# Patient Record
Sex: MALE | Race: BLACK OR AFRICAN AMERICAN | Employment: OTHER | URBAN - METROPOLITAN AREA
[De-identification: names, ages, dates, MRNs, and addresses within clinical notes are randomized per-mention and may not be internally consistent; named-entity substitution may affect disease eponyms.]

---

## 2022-03-19 PROBLEM — N18.32 STAGE 3B CHRONIC KIDNEY DISEASE (HCC): Status: ACTIVE | Noted: 2020-12-15

## 2022-09-22 PROBLEM — I25.10 CAD (CORONARY ARTERY DISEASE): Status: ACTIVE | Noted: 2022-09-22

## 2022-11-28 PROBLEM — Z95.5 STATUS POST INSERTION OF DRUG-ELUTING STENT INTO LEFT ANTERIOR DESCENDING (LAD) ARTERY: Status: ACTIVE | Noted: 2022-11-28

## 2023-01-09 ENCOUNTER — TELEPHONE (OUTPATIENT)
Dept: CARDIOLOGY CLINIC | Age: 78
End: 2023-01-09

## 2023-01-09 DIAGNOSIS — R55 SYNCOPE AND COLLAPSE: Primary | ICD-10-CM

## 2023-01-09 NOTE — TELEPHONE ENCOUNTER
Received call from patient stating he had a syncopal episode about a week ago and loss consciousness for a few seconds. States he may have been overheated at the time. No headache, weakness or dizziness noted prior to episode and no symptoms noted since episode. He also noted that later the same evening he had 2 episodes of hematuria which then cleared up. His blood pressure had maintained at an average 136/70. Continue not to have any symptoms. Discussed with Dr. Seamus Pak and record reviewed. Verbal order and read back per Nubia Henderson MD  - order 14 day event monitor  - notify our office of any new symptoms or any syncopal or near syncopal episodes. This has been fully explained to the patient, who indicates understanding.

## 2023-01-31 ENCOUNTER — TELEPHONE (OUTPATIENT)
Dept: CARDIOLOGY CLINIC | Age: 78
End: 2023-01-31

## 2023-01-31 NOTE — TELEPHONE ENCOUNTER
Patient made aware of echo results and Dr. Sharma Resides remarks. No questions or concerns at present.

## 2023-01-31 NOTE — TELEPHONE ENCOUNTER
----- Message from Kristen Leroy MD sent at 1/31/2023  2:06 PM EST -----  Please let the patient know that his event monitor did not show any significant arrhythmias.  ----- Message -----  From: Tawana Cherry LPN  Sent: 6/21/6790  12:57 PM EST  To: Kristen Leroy MD    Received call from patient stating he had a syncopal episode about a week ago and loss consciousness for a few seconds. States he may have been overheated at the time. No headache, weakness or dizziness noted prior to episode and no symptoms noted since episode. He also noted that later the same evening he had 2 episodes of hematuria which then cleared up. His blood pressure had maintained at an average 136/70. Continue not to have any symptoms. Discussed with Dr. Alonzo Morris and record reviewed. Verbal order and read back per Kristen Leroy MD  - order 14 day event monitor  - notify our office of any new symptoms or any syncopal or near syncopal episodes.   This has been fully explained to the patient, who indicates understanding.

## 2023-02-06 RX ORDER — ROSUVASTATIN CALCIUM 20 MG/1
20 TABLET, COATED ORAL
Qty: 90 TABLET | Refills: 2 | Status: SHIPPED | OUTPATIENT
Start: 2023-02-06

## 2023-02-17 ENCOUNTER — CLINICAL DOCUMENTATION (OUTPATIENT)
Age: 78
End: 2023-02-17

## 2023-02-17 NOTE — PROGRESS NOTES
Received cardiac clearance form from [facility] for [facility] and holding Brilinta. Form filled out per Dr. Dennis Person and faxed to their office. Patient unable to stop Brilinta until the end of April 2023. Confirmation fax received.

## 2023-03-22 ENCOUNTER — HOSPITAL ENCOUNTER (OUTPATIENT)
Facility: HOSPITAL | Age: 78
Discharge: HOME | End: 2023-03-25
Payer: COMMERCIAL

## 2023-03-22 LAB
ALBUMIN SERPL-MCNC: 2.9 G/DL (ref 3.4–5)
ALBUMIN/GLOB SERPL: 0.8 (ref 0.8–1.7)
ALP SERPL-CCNC: 50 U/L (ref 45–117)
ALT SERPL-CCNC: 32 U/L (ref 16–61)
ANION GAP SERPL CALC-SCNC: 5 MMOL/L (ref 3–18)
AST SERPL-CCNC: 20 U/L (ref 10–38)
BASOPHILS # BLD: 0.1 K/UL (ref 0–0.1)
BASOPHILS NFR BLD: 1 % (ref 0–2)
BILIRUB SERPL-MCNC: 0.3 MG/DL (ref 0.2–1)
BUN SERPL-MCNC: 30 MG/DL (ref 7–18)
BUN/CREAT SERPL: 13 (ref 12–20)
CALCIUM SERPL-MCNC: 9.2 MG/DL (ref 8.5–10.1)
CHLORIDE SERPL-SCNC: 114 MMOL/L (ref 100–111)
CO2 SERPL-SCNC: 23 MMOL/L (ref 21–32)
CREAT SERPL-MCNC: 2.24 MG/DL (ref 0.6–1.3)
DIFFERENTIAL METHOD BLD: ABNORMAL
EOSINOPHIL # BLD: 0.6 K/UL (ref 0–0.4)
EOSINOPHIL NFR BLD: 7 % (ref 0–5)
ERYTHROCYTE [DISTWIDTH] IN BLOOD BY AUTOMATED COUNT: 13.4 % (ref 11.6–14.5)
GLOBULIN SER CALC-MCNC: 3.7 G/DL (ref 2–4)
GLUCOSE SERPL-MCNC: 189 MG/DL (ref 74–99)
HCT VFR BLD AUTO: 33.8 % (ref 36–48)
HGB BLD-MCNC: 11 G/DL (ref 13–16)
IMM GRANULOCYTES # BLD AUTO: 0 K/UL (ref 0–0.04)
IMM GRANULOCYTES NFR BLD AUTO: 0 % (ref 0–0.5)
INR PPP: 1 (ref 0.8–1.2)
LYMPHOCYTES # BLD: 1.9 K/UL (ref 0.9–3.6)
LYMPHOCYTES NFR BLD: 22 % (ref 21–52)
MCH RBC QN AUTO: 29.6 PG (ref 24–34)
MCHC RBC AUTO-ENTMCNC: 32.5 G/DL (ref 31–37)
MCV RBC AUTO: 91.1 FL (ref 78–100)
MONOCYTES # BLD: 1 K/UL (ref 0.05–1.2)
MONOCYTES NFR BLD: 11 % (ref 3–10)
NEUTS SEG # BLD: 5.1 K/UL (ref 1.8–8)
NEUTS SEG NFR BLD: 58 % (ref 40–73)
NRBC # BLD: 0 K/UL (ref 0–0.01)
NRBC BLD-RTO: 0 PER 100 WBC
PLATELET # BLD AUTO: 232 K/UL (ref 135–420)
PMV BLD AUTO: 9.7 FL (ref 9.2–11.8)
POTASSIUM SERPL-SCNC: 4.1 MMOL/L (ref 3.5–5.5)
PROT SERPL-MCNC: 6.6 G/DL (ref 6.4–8.2)
PROTHROMBIN TIME: 13.7 SEC (ref 11.5–15.2)
RBC # BLD AUTO: 3.71 M/UL (ref 4.35–5.65)
SODIUM SERPL-SCNC: 142 MMOL/L (ref 136–145)
WBC # BLD AUTO: 8.7 K/UL (ref 4.6–13.2)

## 2023-03-22 PROCEDURE — 85610 PROTHROMBIN TIME: CPT

## 2023-03-22 PROCEDURE — 80053 COMPREHEN METABOLIC PANEL: CPT

## 2023-03-22 PROCEDURE — 85025 COMPLETE CBC W/AUTO DIFF WBC: CPT

## 2023-03-22 PROCEDURE — 36415 COLL VENOUS BLD VENIPUNCTURE: CPT

## 2023-04-03 PROBLEM — E11.9 DIABETES MELLITUS, TYPE 2 (HCC): Status: RESOLVED | Noted: 2021-08-03 | Resolved: 2021-08-03

## 2023-04-27 ENCOUNTER — TELEPHONE (OUTPATIENT)
Age: 78
End: 2023-04-27

## 2023-05-10 ENCOUNTER — ANESTHESIA EVENT (OUTPATIENT)
Facility: HOSPITAL | Age: 78
End: 2023-05-10
Payer: COMMERCIAL

## 2023-05-11 ENCOUNTER — ANESTHESIA (OUTPATIENT)
Facility: HOSPITAL | Age: 78
End: 2023-05-11
Payer: COMMERCIAL

## 2023-05-11 ENCOUNTER — HOSPITAL ENCOUNTER (OUTPATIENT)
Facility: HOSPITAL | Age: 78
Setting detail: OUTPATIENT SURGERY
Discharge: HOME | End: 2023-05-11
Attending: INTERNAL MEDICINE | Admitting: INTERNAL MEDICINE
Payer: COMMERCIAL

## 2023-05-11 VITALS
WEIGHT: 197.1 LBS | SYSTOLIC BLOOD PRESSURE: 134 MMHG | TEMPERATURE: 97.6 F | BODY MASS INDEX: 29.19 KG/M2 | HEIGHT: 69 IN | RESPIRATION RATE: 15 BRPM | OXYGEN SATURATION: 97 % | DIASTOLIC BLOOD PRESSURE: 64 MMHG | HEART RATE: 54 BPM

## 2023-05-11 LAB
BASOPHILS # BLD: 0.1 K/UL (ref 0–0.1)
BASOPHILS NFR BLD: 1 % (ref 0–2)
DIFFERENTIAL METHOD BLD: ABNORMAL
EOSINOPHIL # BLD: 0.5 K/UL (ref 0–0.4)
EOSINOPHIL NFR BLD: 6 % (ref 0–5)
ERYTHROCYTE [DISTWIDTH] IN BLOOD BY AUTOMATED COUNT: 13 % (ref 11.6–14.5)
FERRITIN SERPL-MCNC: 200 NG/ML (ref 8–388)
GLUCOSE BLD STRIP.AUTO-MCNC: 179 MG/DL (ref 70–110)
GLUCOSE BLD STRIP.AUTO-MCNC: 198 MG/DL (ref 70–110)
HCT VFR BLD AUTO: 31.5 % (ref 36–48)
HGB BLD-MCNC: 10.2 G/DL (ref 13–16)
IMM GRANULOCYTES # BLD AUTO: 0 K/UL (ref 0–0.04)
IMM GRANULOCYTES NFR BLD AUTO: 0 % (ref 0–0.5)
IRON SATN MFR SERPL: 24 % (ref 20–50)
IRON SERPL-MCNC: 46 UG/DL (ref 50–175)
LYMPHOCYTES # BLD: 1.9 K/UL (ref 0.9–3.6)
LYMPHOCYTES NFR BLD: 26 % (ref 21–52)
MCH RBC QN AUTO: 29.7 PG (ref 24–34)
MCHC RBC AUTO-ENTMCNC: 32.4 G/DL (ref 31–37)
MCV RBC AUTO: 91.8 FL (ref 78–100)
MONOCYTES # BLD: 0.9 K/UL (ref 0.05–1.2)
MONOCYTES NFR BLD: 12 % (ref 3–10)
NEUTS SEG # BLD: 4 K/UL (ref 1.8–8)
NEUTS SEG NFR BLD: 55 % (ref 40–73)
NRBC # BLD: 0 K/UL (ref 0–0.01)
NRBC BLD-RTO: 0 PER 100 WBC
PLATELET # BLD AUTO: 201 K/UL (ref 135–420)
PMV BLD AUTO: 9.6 FL (ref 9.2–11.8)
RBC # BLD AUTO: 3.43 M/UL (ref 4.35–5.65)
TIBC SERPL-MCNC: 193 UG/DL (ref 250–450)
WBC # BLD AUTO: 7.3 K/UL (ref 4.6–13.2)

## 2023-05-11 PROCEDURE — 3600007512: Performed by: INTERNAL MEDICINE

## 2023-05-11 PROCEDURE — 6360000002 HC RX W HCPCS: Performed by: NURSE ANESTHETIST, CERTIFIED REGISTERED

## 2023-05-11 PROCEDURE — 83550 IRON BINDING TEST: CPT

## 2023-05-11 PROCEDURE — 83540 ASSAY OF IRON: CPT

## 2023-05-11 PROCEDURE — 2580000003 HC RX 258: Performed by: INTERNAL MEDICINE

## 2023-05-11 PROCEDURE — 7100000000 HC PACU RECOVERY - FIRST 15 MIN: Performed by: INTERNAL MEDICINE

## 2023-05-11 PROCEDURE — 36415 COLL VENOUS BLD VENIPUNCTURE: CPT

## 2023-05-11 PROCEDURE — 7100000011 HC PHASE II RECOVERY - ADDTL 15 MIN: Performed by: INTERNAL MEDICINE

## 2023-05-11 PROCEDURE — 3600007502: Performed by: INTERNAL MEDICINE

## 2023-05-11 PROCEDURE — 3700000001 HC ADD 15 MINUTES (ANESTHESIA): Performed by: INTERNAL MEDICINE

## 2023-05-11 PROCEDURE — 2500000003 HC RX 250 WO HCPCS: Performed by: NURSE ANESTHETIST, CERTIFIED REGISTERED

## 2023-05-11 PROCEDURE — 82962 GLUCOSE BLOOD TEST: CPT

## 2023-05-11 PROCEDURE — 7100000010 HC PHASE II RECOVERY - FIRST 15 MIN: Performed by: INTERNAL MEDICINE

## 2023-05-11 PROCEDURE — 2709999900 HC NON-CHARGEABLE SUPPLY: Performed by: INTERNAL MEDICINE

## 2023-05-11 PROCEDURE — 88342 IMHCHEM/IMCYTCHM 1ST ANTB: CPT

## 2023-05-11 PROCEDURE — 88305 TISSUE EXAM BY PATHOLOGIST: CPT

## 2023-05-11 PROCEDURE — 3700000000 HC ANESTHESIA ATTENDED CARE: Performed by: INTERNAL MEDICINE

## 2023-05-11 PROCEDURE — 85025 COMPLETE CBC W/AUTO DIFF WBC: CPT

## 2023-05-11 PROCEDURE — 82728 ASSAY OF FERRITIN: CPT

## 2023-05-11 RX ORDER — PROPOFOL 10 MG/ML
INJECTION, EMULSION INTRAVENOUS PRN
Status: DISCONTINUED | OUTPATIENT
Start: 2023-05-11 | End: 2023-05-11 | Stop reason: SDUPTHER

## 2023-05-11 RX ORDER — LIDOCAINE HYDROCHLORIDE 20 MG/ML
INJECTION, SOLUTION EPIDURAL; INFILTRATION; INTRACAUDAL; PERINEURAL PRN
Status: DISCONTINUED | OUTPATIENT
Start: 2023-05-11 | End: 2023-05-11 | Stop reason: SDUPTHER

## 2023-05-11 RX ORDER — SODIUM CHLORIDE, SODIUM LACTATE, POTASSIUM CHLORIDE, CALCIUM CHLORIDE 600; 310; 30; 20 MG/100ML; MG/100ML; MG/100ML; MG/100ML
INJECTION, SOLUTION INTRAVENOUS CONTINUOUS
Status: DISCONTINUED | OUTPATIENT
Start: 2023-05-11 | End: 2023-05-11 | Stop reason: HOSPADM

## 2023-05-11 RX ADMIN — PROPOFOL 50 MG: 10 INJECTION, EMULSION INTRAVENOUS at 07:40

## 2023-05-11 RX ADMIN — PROPOFOL 50 MG: 10 INJECTION, EMULSION INTRAVENOUS at 07:48

## 2023-05-11 RX ADMIN — LIDOCAINE HYDROCHLORIDE 60 MG: 20 INJECTION, SOLUTION EPIDURAL; INFILTRATION; INTRACAUDAL; PERINEURAL at 07:34

## 2023-05-11 RX ADMIN — SODIUM CHLORIDE, SODIUM LACTATE, POTASSIUM CHLORIDE, AND CALCIUM CHLORIDE: 600; 310; 30; 20 INJECTION, SOLUTION INTRAVENOUS at 07:30

## 2023-05-11 RX ADMIN — PROPOFOL 100 MG: 10 INJECTION, EMULSION INTRAVENOUS at 07:34

## 2023-05-11 RX ADMIN — PROPOFOL 50 MG: 10 INJECTION, EMULSION INTRAVENOUS at 07:39

## 2023-05-11 ASSESSMENT — PAIN SCALES - GENERAL
PAINLEVEL_OUTOF10: 0

## 2023-05-11 ASSESSMENT — PAIN - FUNCTIONAL ASSESSMENT: PAIN_FUNCTIONAL_ASSESSMENT: 0-10

## 2023-05-11 NOTE — DISCHARGE INSTRUCTIONS
Upper GI Endoscopy: What to Expect at [address] had an upper GI endoscopy. Your doctor used a thin, lighted tube that bends to look at the inside of your esophagus, your stomach, and the first part of the small intestine, called the duodenum. After you have an endoscopy, you will stay at the hospital or clinic for 1 to 2 hours. This will allow the medicine to wear off. You will be able to go home after your doctor or nurse checks to make sure that you're not having any problems. You may have to stay overnight if you had treatment during the test. You may have a sore throat for a day or two after the test.  This care sheet gives you a general idea about what to expect after the test.  How can you care for yourself at home? Activity   Rest as much as you need to after you go home. You should be able to go back to your usual activities the day after the test.  Diet   Follow your doctor's directions for eating after the test.  Drink plenty of fluids (unless your doctor has told you not to). Medications   If you have a sore throat the day after the test, use an over-the-counter spray to numb your throat. Follow-up care is a key part of your treatment and safety. Be sure to make and go to all appointments, and call your doctor if you are having problems. It's also a good idea to know your test results and keep a list of the medicines you take. When should you call for help? Call 911 anytime you think you may need emergency care. For example, call if:    You passed out (lost consciousness). You have trouble breathing. You pass maroon or bloody stools. Call your doctor now or seek immediate medical care if:    You have pain that does not get better after your take pain medicine. You have new or worse belly pain. You have blood in your stools. You are sick to your stomach and cannot keep fluids down. You have a fever. You cannot pass stools or gas.    Watch closely

## 2023-05-11 NOTE — H&P
[facility]  [phone]      History and Physical    Patient: Star Rivera MRN: [id number]  SSN: [id number]    YOB: 1945  Age: 69 y.o. Sex: male      Subjective:      Star Rivera is a 69 y.o. male who is seen today for a follow up visit. He was referred by PCP for evaluation of STANLEY. 1. Anemia - He has a history of MGUS, followed by Dr. Tapan German, hematologist. He has previously received iron infusions in 2021 when he was noted to have absent iron stores on bone marrow biopsy. More recently, he had an episode of syncope while visiting sister which prompted labs, noted to have drop in Hgb since comparison labs. He denies rectal bleeding or melena. He was recently started on oral iron by PCP. He also has CKD3, followed by nephrology, has not required epo. He had episode of deep hematuria last month, followed by urology for BPH, has follow up planned. 1/4/2023 - [facility] labs - Hgb 10.8, Hct 34.1, MCV 92, Iron sat 17%, Ferritin 250, Cr 2.3, eGFR 28.6, 10/2022 Hgb 11.7, Hct 35.5    2. Weight loss - He reports purposeful weight loss which has been quicker than expected. Per PCP documentation, 17lbs in 3 week span. Since then, he reports weight loss has stabilized. He has been less restrictive w/ diet. He denies dysphagia, early satiety, change in appetite, nausea, vomiting. 3. CRCS - Colonoscopy up to date, last done 9/2022 as noted below, 3 year repeat needed for polyps. History of villous adenoma in 2017. Americo Vishnu Past Medical History:   Diagnosis Date    Bladder outlet obstruction     BPH (benign prostatic hyperplasia)     Carotid bruit 06/12/2014    No significant occlusive disease bilaterally. Nodules in right thyroid. Chest pain     sounds musculoskeletal in etiology    CVA (cerebral infarction)     LACUNAR 6/2014    DVT (deep venous thrombosis) ([facility] [address] 03/04/2017    Right leg:  No DVT.     Elevated prostate specific antigen (PSA)     Elevated PSA     Enlarged

## 2023-05-11 NOTE — ANESTHESIA POSTPROCEDURE EVALUATION
Department of Anesthesiology  Postprocedure Note    Patient: Isacc Núñez  MRN: [id number]  YOB: 1945  Date of evaluation: 5/11/2023      Procedure Summary     Date: 05/11/23 Room / Location: [facility] ENDO 03 / [facility] ENDOSCOPY    Anesthesia Start: 0730 Anesthesia Stop: 2001    Procedure: EGD ESOPHAGOGASTRODUODENOSCOPY with bxs and polypectomies and clip (Upper GI Region) Diagnosis:       Personal history of colonic polyps      Iron deficiency anemia, unspecified iron deficiency anemia type      Overweight      Weight loss      (Personal history of colonic polyps [Z86.010])      (Iron deficiency anemia, unspecified iron deficiency anemia type [D50.9])      (Overweight [E66.3])      (Weight loss [R63.4])    Surgeons: Judy Montiel MD Responsible Provider: Matilda Jimenez MD    Anesthesia Type: MAC ASA Status: 3          Anesthesia Type: MAC    Abhi Phase I: Abhi Score: 10    Abhi Phase II:        Anesthesia Post Evaluation    Patient location during evaluation: PACU  Patient participation: complete - patient participated  Level of consciousness: awake  Airway patency: patent  Nausea & Vomiting: no nausea  Complications: no  Cardiovascular status: blood pressure returned to baseline  Respiratory status: acceptable  Hydration status: euvolemic

## 2023-05-11 NOTE — ANESTHESIA PRE PROCEDURE
lb (90.3 kg)   11/28/22 202 lb (91.6 kg)     Body mass index is 27.76 kg/m². CBC:   Lab Results   Component Value Date/Time    WBC 8.7 03/22/2023 10:24 AM    RBC 3.71 03/22/2023 10:24 AM    HGB 11.0 03/22/2023 10:24 AM    HCT 33.8 03/22/2023 10:24 AM    MCV 91.1 03/22/2023 10:24 AM    RDW 13.4 03/22/2023 10:24 AM     03/22/2023 10:24 AM       CMP:   Lab Results   Component Value Date/Time     03/22/2023 10:24 AM    K 4.1 03/22/2023 10:24 AM     03/22/2023 10:24 AM    CO2 23 03/22/2023 10:24 AM    BUN 30 03/22/2023 10:24 AM    CREATININE 2.24 03/22/2023 10:24 AM    GFRAA 45 09/23/2022 05:10 AM    AGRATIO 1.1 10/19/2022 10:52 AM    LABGLOM 30 03/22/2023 10:24 AM    GLUCOSE 189 03/22/2023 10:24 AM    PROT 6.6 03/22/2023 10:24 AM    CALCIUM 9.2 03/22/2023 10:24 AM    BILITOT 0.3 03/22/2023 10:24 AM    ALKPHOS 50 03/22/2023 10:24 AM    ALKPHOS 65 10/19/2022 10:52 AM    AST 20 03/22/2023 10:24 AM    ALT 32 03/22/2023 10:24 AM       POC Tests: No results for input(s): POCGLU, POCNA, POCK, POCCL, POCBUN, POCHEMO, POCHCT in the last 72 hours.     Coags:   Lab Results   Component Value Date/Time    PROTIME 13.7 03/22/2023 10:24 AM    INR 1.0 03/22/2023 10:24 AM    APTT 29.8 05/19/2021 09:52 AM       HCG (If Applicable): No results found for: PREGTESTUR, PREGSERUM, HCG, HCGQUANT     ABGs: No results found for: PHART, PO2ART, NPV7NZN, JGY9KZJ, BEART, V4XIGAFR     Type & Screen (If Applicable):  No results found for: LABABO, [address]    Drug/Infectious Status (If Applicable):  Lab Results   Component Value Date/Time    HEPCAB 0.1 11/29/2016 12:31 PM    HEPCAB NEGATIVE 11/29/2016 12:31 PM       COVID-19 Screening (If Applicable): No results found for: COVID19        Anesthesia Evaluation  Patient summary reviewed and Nursing notes reviewed  Airway: Mallampati: II  TM distance: >3 FB   Neck ROM: full  Mouth opening: > = 3 FB   Dental: normal exam         Pulmonary:normal exam    (+) sleep apnea:

## 2023-05-11 NOTE — PROGRESS NOTES
Patient may resume Brillinta in 72 hours. Cristina Choudhury MD  [facility] [address]  cell - [phone]  www. [facility]. [facility]

## 2023-05-23 RX ORDER — TICAGRELOR 90 MG/1
TABLET ORAL
Qty: 60 TABLET | Refills: 6 | Status: SHIPPED | OUTPATIENT
Start: 2023-05-23

## 2023-05-25 ENCOUNTER — HOSPITAL ENCOUNTER (OUTPATIENT)
Facility: HOSPITAL | Age: 78
Discharge: HOME | End: 2023-05-25
Payer: COMMERCIAL

## 2023-05-25 LAB
25(OH)D3 SERPL-MCNC: 105.8 NG/ML (ref 30–100)
ALBUMIN SERPL-MCNC: 2.9 G/DL (ref 3.4–5)
ANION GAP SERPL CALC-SCNC: 4 MMOL/L (ref 3–18)
BUN SERPL-MCNC: 40 MG/DL (ref 7–18)
BUN/CREAT SERPL: 16 (ref 12–20)
CALCIUM SERPL-MCNC: 9 MG/DL (ref 8.5–10.1)
CALCIUM SERPL-MCNC: 9 MG/DL (ref 8.5–10.1)
CHLORIDE SERPL-SCNC: 110 MMOL/L (ref 100–111)
CO2 SERPL-SCNC: 25 MMOL/L (ref 21–32)
CREAT SERPL-MCNC: 2.43 MG/DL (ref 0.6–1.3)
CREAT UR-MCNC: 80 MG/DL (ref 30–125)
FERRITIN SERPL-MCNC: 227 NG/ML (ref 8–388)
GLUCOSE SERPL-MCNC: 204 MG/DL (ref 74–99)
HCT VFR BLD AUTO: 33 % (ref 36–48)
HGB BLD-MCNC: 10.7 G/DL (ref 13–16)
IRON SATN MFR SERPL: 23 % (ref 20–50)
IRON SERPL-MCNC: 51 UG/DL (ref 50–175)
MICROALBUMIN UR-MCNC: 130 MG/DL (ref 0–3)
MICROALBUMIN/CREAT UR-RTO: 1625 MG/G (ref 0–30)
PHOSPHATE SERPL-MCNC: 4.1 MG/DL (ref 2.5–4.9)
POTASSIUM SERPL-SCNC: 4.3 MMOL/L (ref 3.5–5.5)
PTH-INTACT SERPL-MCNC: 73.2 PG/ML (ref 18.4–88)
SODIUM SERPL-SCNC: 139 MMOL/L (ref 136–145)
TIBC SERPL-MCNC: 221 UG/DL (ref 250–450)

## 2023-05-25 PROCEDURE — 85018 HEMOGLOBIN: CPT

## 2023-05-25 PROCEDURE — 82043 UR ALBUMIN QUANTITATIVE: CPT

## 2023-05-25 PROCEDURE — 85014 HEMATOCRIT: CPT

## 2023-05-25 PROCEDURE — 83550 IRON BINDING TEST: CPT

## 2023-05-25 PROCEDURE — 83970 ASSAY OF PARATHORMONE: CPT

## 2023-05-25 PROCEDURE — 36415 COLL VENOUS BLD VENIPUNCTURE: CPT

## 2023-05-25 PROCEDURE — 82570 ASSAY OF URINE CREATININE: CPT

## 2023-05-25 PROCEDURE — 82306 VITAMIN D 25 HYDROXY: CPT

## 2023-05-25 PROCEDURE — 82728 ASSAY OF FERRITIN: CPT

## 2023-05-25 PROCEDURE — 80069 RENAL FUNCTION PANEL: CPT

## 2023-05-25 PROCEDURE — 83540 ASSAY OF IRON: CPT

## 2023-06-02 PROBLEM — M54.16 LUMBAR RADICULOPATHY: Status: ACTIVE | Noted: 2023-06-02

## 2023-06-02 PROBLEM — T50.8X5A CONTRAST-INDUCED NEPHROPATHY: Status: ACTIVE | Noted: 2022-11-15

## 2023-06-02 PROBLEM — M47.817 LUMBOSACRAL SPONDYLOSIS WITHOUT MYELOPATHY: Status: ACTIVE | Noted: 2023-06-02

## 2023-06-02 PROBLEM — R19.7 DIARRHEA: Status: ACTIVE | Noted: 2021-12-12

## 2023-06-02 PROBLEM — E66.09 OBESITY DUE TO EXCESS CALORIES: Status: ACTIVE | Noted: 2023-06-02

## 2023-06-02 PROBLEM — E78.5 HYPERLIPIDEMIA: Status: ACTIVE | Noted: 2021-09-13

## 2023-06-02 PROBLEM — H43.813 VITREOUS DEGENERATION OF BOTH EYES: Status: ACTIVE | Noted: 2021-09-13

## 2023-06-02 PROBLEM — R55 SYNCOPE AND COLLAPSE: Status: ACTIVE | Noted: 2021-12-12

## 2023-06-02 PROBLEM — N17.9 AKI (ACUTE KIDNEY INJURY) (HCC): Status: ACTIVE | Noted: 2021-12-12

## 2023-06-02 PROBLEM — I45.10 RBBB: Status: ACTIVE | Noted: 2021-12-12

## 2023-06-02 PROBLEM — M43.10 ACQUIRED SPONDYLOLISTHESIS: Status: ACTIVE | Noted: 2023-06-02

## 2023-06-02 PROBLEM — N42.9 PROSTATE DISORDER: Status: ACTIVE | Noted: 2021-12-12

## 2023-06-02 PROBLEM — E66.3 OVERWEIGHT (BMI 25.0-29.9): Status: ACTIVE | Noted: 2021-12-12

## 2023-06-02 PROBLEM — H25.013 CORTICAL AGE-RELATED CATARACT, BILATERAL: Status: ACTIVE | Noted: 2021-09-13

## 2023-06-02 PROBLEM — D47.2 MONOCLONAL GAMMOPATHY OF UNKNOWN SIGNIFICANCE (MGUS): Status: ACTIVE | Noted: 2022-11-15

## 2023-06-02 PROBLEM — N14.11 CONTRAST-INDUCED NEPHROPATHY: Status: ACTIVE | Noted: 2022-11-15

## 2023-06-02 RX ORDER — ACETAMINOPHEN 500 MG
500 TABLET ORAL EVERY 6 HOURS PRN
COMMUNITY

## 2023-06-02 RX ORDER — FERROUS SULFATE 325(65) MG
325 TABLET ORAL 2 TIMES DAILY
COMMUNITY

## 2023-06-05 ENCOUNTER — OFFICE VISIT (OUTPATIENT)
Age: 78
End: 2023-06-05
Payer: COMMERCIAL

## 2023-06-05 VITALS
HEART RATE: 59 BPM | DIASTOLIC BLOOD PRESSURE: 60 MMHG | SYSTOLIC BLOOD PRESSURE: 110 MMHG | HEIGHT: 69 IN | WEIGHT: 189 LBS | BODY MASS INDEX: 27.99 KG/M2 | OXYGEN SATURATION: 98 %

## 2023-06-05 DIAGNOSIS — I25.119 ATHEROSCLEROSIS OF NATIVE CORONARY ARTERY OF NATIVE HEART WITH ANGINA PECTORIS (HCC): Primary | ICD-10-CM

## 2023-06-05 DIAGNOSIS — I10 ESSENTIAL (PRIMARY) HYPERTENSION: ICD-10-CM

## 2023-06-05 DIAGNOSIS — E11.22 TYPE 2 DIABETES MELLITUS WITH CHRONIC KIDNEY DISEASE, WITHOUT LONG-TERM CURRENT USE OF INSULIN, UNSPECIFIED CKD STAGE (HCC): ICD-10-CM

## 2023-06-05 DIAGNOSIS — G47.33 OBSTRUCTIVE SLEEP APNEA (ADULT) (PEDIATRIC): ICD-10-CM

## 2023-06-05 DIAGNOSIS — E78.2 MIXED HYPERLIPIDEMIA: ICD-10-CM

## 2023-06-05 PROCEDURE — 99214 OFFICE O/P EST MOD 30 MIN: CPT | Performed by: INTERNAL MEDICINE

## 2023-06-05 PROCEDURE — 1123F ACP DISCUSS/DSCN MKR DOCD: CPT | Performed by: INTERNAL MEDICINE

## 2023-06-05 PROCEDURE — 3078F DIAST BP <80 MM HG: CPT | Performed by: INTERNAL MEDICINE

## 2023-06-05 PROCEDURE — 3074F SYST BP LT 130 MM HG: CPT | Performed by: INTERNAL MEDICINE

## 2023-06-05 PROCEDURE — 93000 ELECTROCARDIOGRAM COMPLETE: CPT | Performed by: INTERNAL MEDICINE

## 2023-06-05 RX ORDER — ASCORBIC ACID 500 MG
500 TABLET ORAL DAILY
COMMUNITY

## 2023-06-05 ASSESSMENT — ENCOUNTER SYMPTOMS
COUGH: 0
VOMITING: 0
SHORTNESS OF BREATH: 0
NAUSEA: 0
ABDOMINAL PAIN: 0
SORE THROAT: 0
ABDOMINAL DISTENTION: 0

## 2023-06-05 ASSESSMENT — PATIENT HEALTH QUESTIONNAIRE - PHQ9
10. IF YOU CHECKED OFF ANY PROBLEMS, HOW DIFFICULT HAVE THESE PROBLEMS MADE IT FOR YOU TO DO YOUR WORK, TAKE CARE OF THINGS AT HOME, OR GET ALONG WITH OTHER PEOPLE: 0
3. TROUBLE FALLING OR STAYING ASLEEP: 0
5. POOR APPETITE OR OVEREATING: 0
1. LITTLE INTEREST OR PLEASURE IN DOING THINGS: 0
8. MOVING OR SPEAKING SO SLOWLY THAT OTHER PEOPLE COULD HAVE NOTICED. OR THE OPPOSITE, BEING SO FIGETY OR RESTLESS THAT YOU HAVE BEEN MOVING AROUND A LOT MORE THAN USUAL: 0
2. FEELING DOWN, DEPRESSED OR HOPELESS: 0
9. THOUGHTS THAT YOU WOULD BE BETTER OFF DEAD, OR OF HURTING YOURSELF: 0
SUM OF ALL RESPONSES TO PHQ QUESTIONS 1-9: 0
SUM OF ALL RESPONSES TO PHQ9 QUESTIONS 1 & 2: 0
SUM OF ALL RESPONSES TO PHQ QUESTIONS 1-9: 0
7. TROUBLE CONCENTRATING ON THINGS, SUCH AS READING THE NEWSPAPER OR WATCHING TELEVISION: 0
6. FEELING BAD ABOUT YOURSELF - OR THAT YOU ARE A FAILURE OR HAVE LET YOURSELF OR YOUR FAMILY DOWN: 0
4. FEELING TIRED OR HAVING LITTLE ENERGY: 0

## 2023-06-05 NOTE — PROGRESS NOTES
Tere Colvin presents today for   Chief Complaint   Patient presents with    Follow-up     6 month follow up       Lucia Palacios III preferred language for health care discussion is english/other. Is someone accompanying this pt? no    Is the patient using any DME equipment during OV? no    Depression Screening:  Depression: Not at risk    PHQ-2 Score: 0        Learning Assessment:  Who is the primary learner? Patient    What is the preferred language for health care of the primary learner? ENGLISH    How does the primary learner prefer to learn new concepts? DEMONSTRATION    Answered By patient    Relationship to Learner SELF           Pt currently taking Anticoagulant therapy? no    Pt currently taking Antiplatelet therapy ? Brilinta 90 mg twice a day and ASA 81 mg once a day      Coordination of Care:  1. Have you been to the ER, urgent care clinic since your last visit? Hospitalized since your last visit? no    2. Have you seen or consulted any other health care providers outside of the [address] [place] [address] since your last visit? Include any pap smears or colon screening.  no
episode which occurred in January of this year when he was wearing a winter coat and a warm house. No recurrence. Dyslipidemia. Patient is now taking Crestor 20 mg daily. I would like to check a repeat fasting lipid profile next month. His goal LDL should be less than 70. Diabetes mellitus, on oral agents been followed by his primary care physician. His goal A1c should be less than 7. Now that he has significant CAD, he would be a good candidate for an SGLT2 inhibitor. Chronic kidney disease, stage III. He is followed regularly by a nephrologist.    Obstructive sleep apnea. Now on nightly CPAP. Follow-up in 6 months, sooner if needed.         Dominga Hartmann MD

## 2023-09-26 ENCOUNTER — HOSPITAL ENCOUNTER (OUTPATIENT)
Facility: HOSPITAL | Age: 78
Discharge: HOME | End: 2023-09-29
Payer: COMMERCIAL

## 2023-09-26 LAB
25(OH)D3 SERPL-MCNC: 59.3 NG/ML (ref 30–100)
ALBUMIN SERPL-MCNC: 3.3 G/DL (ref 3.4–5)
ANION GAP SERPL CALC-SCNC: 7 MMOL/L (ref 3–18)
BUN SERPL-MCNC: 39 MG/DL (ref 7–18)
BUN/CREAT SERPL: 16 (ref 12–20)
CALCIUM SERPL-MCNC: 9.4 MG/DL (ref 8.5–10.1)
CALCIUM SERPL-MCNC: 9.5 MG/DL (ref 8.5–10.1)
CHLORIDE SERPL-SCNC: 110 MMOL/L (ref 100–111)
CO2 SERPL-SCNC: 22 MMOL/L (ref 21–32)
CREAT SERPL-MCNC: 2.49 MG/DL (ref 0.6–1.3)
CREAT UR-MCNC: 161 MG/DL (ref 30–125)
GLUCOSE SERPL-MCNC: 125 MG/DL (ref 74–99)
MICROALBUMIN UR-MCNC: 333 MG/DL (ref 0–3)
MICROALBUMIN/CREAT UR-RTO: 2068 MG/G (ref 0–30)
PHOSPHATE SERPL-MCNC: 3.7 MG/DL (ref 2.5–4.9)
POTASSIUM SERPL-SCNC: 4.4 MMOL/L (ref 3.5–5.5)
PTH-INTACT SERPL-MCNC: 67 PG/ML (ref 18.4–88)
SODIUM SERPL-SCNC: 139 MMOL/L (ref 136–145)

## 2023-09-26 PROCEDURE — 82570 ASSAY OF URINE CREATININE: CPT

## 2023-09-26 PROCEDURE — 82306 VITAMIN D 25 HYDROXY: CPT

## 2023-09-26 PROCEDURE — 83970 ASSAY OF PARATHORMONE: CPT

## 2023-09-26 PROCEDURE — 80069 RENAL FUNCTION PANEL: CPT

## 2023-09-26 PROCEDURE — 82043 UR ALBUMIN QUANTITATIVE: CPT

## 2023-09-26 PROCEDURE — 36415 COLL VENOUS BLD VENIPUNCTURE: CPT

## 2023-09-26 NOTE — PROGRESS NOTES
Aexl Yusuf presents today to discuss 30 day event monitor results. He was hospitalized at [facility] from 8/3/23 through 8/5/23 after a syncopal episode. He states that he was talking to a neighbor in his garage when he told his friend that he was going to pass out. He states that he was \"out\" a few seconds. He had complete work-up done at [facility] (carotid duplex showed <50% stenosis of both the right and left ICAs),  echo (structurally normal), EKG, CT of the head, orthostatics, telemetry monitoring were all negative). He was discharged home with a 30 day event monitor which he wore through 92/23). His  event monitor showed some 1st degree AV block with the slowest rate being 55 bpm.. PAC and PVC burden (both <1%). No pauses or arrhythmias noted. He states that this is second syncopal episode this year. The first one occurred in Jan. Each time it has happened, he states that his vision darkens and he then \"passes out\" for just a few seconds. There has been no loss of bowel or bladder function. He states that he has been under a lot of stress due to a recent divorce. He is a 69year old male with history of paroxysmal supraventricular tachycardia, hypertension, dyslipidemia and diabetes. He also has a history of atypical chest pain and a borderline abnormal stress test in 2010. He had an echo done in Dec. 2021 when he was hospitalized for food poisoning and it showed an EF 55 to 44%, grade 1 diastolic dysfunction, mild LVH with normal PA pressures and no valvular heart disease. Axel Yusuf is a 69 y.o. male presents today for :  Chief Complaint   Patient presents with    Follow-Up from Hospital     syncope       PMH:  Past Medical History:   Diagnosis Date    Bladder outlet obstruction     BPH (benign prostatic hyperplasia)     Carotid bruit 06/12/2014    No significant occlusive disease bilaterally. Nodules in right thyroid.     Chest pain     sounds musculoskeletal in etiology    CVA

## 2023-09-27 ENCOUNTER — OFFICE VISIT (OUTPATIENT)
Age: 78
End: 2023-09-27
Payer: COMMERCIAL

## 2023-09-27 VITALS
HEIGHT: 69 IN | OXYGEN SATURATION: 98 % | WEIGHT: 192 LBS | HEART RATE: 59 BPM | SYSTOLIC BLOOD PRESSURE: 138 MMHG | BODY MASS INDEX: 28.44 KG/M2 | DIASTOLIC BLOOD PRESSURE: 66 MMHG

## 2023-09-27 DIAGNOSIS — G47.33 OBSTRUCTIVE SLEEP APNEA (ADULT) (PEDIATRIC): ICD-10-CM

## 2023-09-27 DIAGNOSIS — I25.119 ATHEROSCLEROSIS OF NATIVE CORONARY ARTERY OF NATIVE HEART WITH ANGINA PECTORIS (HCC): ICD-10-CM

## 2023-09-27 DIAGNOSIS — E11.22 TYPE 2 DIABETES MELLITUS WITH CHRONIC KIDNEY DISEASE, WITHOUT LONG-TERM CURRENT USE OF INSULIN, UNSPECIFIED CKD STAGE (HCC): ICD-10-CM

## 2023-09-27 DIAGNOSIS — E78.2 MIXED HYPERLIPIDEMIA: ICD-10-CM

## 2023-09-27 DIAGNOSIS — R55 SYNCOPE AND COLLAPSE: Primary | ICD-10-CM

## 2023-09-27 DIAGNOSIS — I10 ESSENTIAL (PRIMARY) HYPERTENSION: ICD-10-CM

## 2023-09-27 PROBLEM — E78.49 OTHER HYPERLIPIDEMIA: Status: ACTIVE | Noted: 2021-12-12

## 2023-09-27 PROBLEM — N18.4 TYPE 2 DM WITH CKD STAGE 4 AND HYPERTENSION (HCC): Status: ACTIVE | Noted: 2021-12-12

## 2023-09-27 PROBLEM — I12.9 TYPE 2 DM WITH CKD STAGE 4 AND HYPERTENSION (HCC): Status: ACTIVE | Noted: 2021-12-12

## 2023-09-27 PROCEDURE — 99214 OFFICE O/P EST MOD 30 MIN: CPT | Performed by: NURSE PRACTITIONER

## 2023-09-27 PROCEDURE — 3074F SYST BP LT 130 MM HG: CPT | Performed by: NURSE PRACTITIONER

## 2023-09-27 PROCEDURE — 1123F ACP DISCUSS/DSCN MKR DOCD: CPT | Performed by: NURSE PRACTITIONER

## 2023-09-27 PROCEDURE — 93000 ELECTROCARDIOGRAM COMPLETE: CPT | Performed by: NURSE PRACTITIONER

## 2023-09-27 PROCEDURE — 3078F DIAST BP <80 MM HG: CPT | Performed by: NURSE PRACTITIONER

## 2023-09-27 ASSESSMENT — PATIENT HEALTH QUESTIONNAIRE - PHQ9
9. THOUGHTS THAT YOU WOULD BE BETTER OFF DEAD, OR OF HURTING YOURSELF: 0
SUM OF ALL RESPONSES TO PHQ9 QUESTIONS 1 & 2: 0
SUM OF ALL RESPONSES TO PHQ QUESTIONS 1-9: 0
3. TROUBLE FALLING OR STAYING ASLEEP: 0
1. LITTLE INTEREST OR PLEASURE IN DOING THINGS: 0
5. POOR APPETITE OR OVEREATING: 0
SUM OF ALL RESPONSES TO PHQ QUESTIONS 1-9: 0
2. FEELING DOWN, DEPRESSED OR HOPELESS: 0
SUM OF ALL RESPONSES TO PHQ QUESTIONS 1-9: 0
SUM OF ALL RESPONSES TO PHQ QUESTIONS 1-9: 0
6. FEELING BAD ABOUT YOURSELF - OR THAT YOU ARE A FAILURE OR HAVE LET YOURSELF OR YOUR FAMILY DOWN: 0
8. MOVING OR SPEAKING SO SLOWLY THAT OTHER PEOPLE COULD HAVE NOTICED. OR THE OPPOSITE, BEING SO FIGETY OR RESTLESS THAT YOU HAVE BEEN MOVING AROUND A LOT MORE THAN USUAL: 0
7. TROUBLE CONCENTRATING ON THINGS, SUCH AS READING THE NEWSPAPER OR WATCHING TELEVISION: 0
4. FEELING TIRED OR HAVING LITTLE ENERGY: 0

## 2023-09-27 ASSESSMENT — ANXIETY QUESTIONNAIRES
6. BECOMING EASILY ANNOYED OR IRRITABLE: 0
2. NOT BEING ABLE TO STOP OR CONTROL WORRYING: 0
1. FEELING NERVOUS, ANXIOUS, OR ON EDGE: 0
7. FEELING AFRAID AS IF SOMETHING AWFUL MIGHT HAPPEN: 0
5. BEING SO RESTLESS THAT IT IS HARD TO SIT STILL: 0
4. TROUBLE RELAXING: 0
GAD7 TOTAL SCORE: 0
3. WORRYING TOO MUCH ABOUT DIFFERENT THINGS: 0

## 2023-09-27 ASSESSMENT — ENCOUNTER SYMPTOMS
DIARRHEA: 0
WHEEZING: 0
BLOOD IN STOOL: 0
NAUSEA: 0
COUGH: 0
VOMITING: 0
CHEST TIGHTNESS: 0
SHORTNESS OF BREATH: 0
CONSTIPATION: 0
ABDOMINAL DISTENTION: 0

## 2023-09-27 NOTE — PROGRESS NOTES
Mariann Miller presents today for   Chief Complaint   Patient presents with    Follow-Up from Hospital     syncope       Elvin Regulus III preferred language for health care discussion is english/other. Is someone accompanying this pt? no    Is the patient using any DME equipment during OV? no    Depression Screening:  Depression: Not at risk (9/27/2023)    PHQ-2     PHQ-2 Score: 0        Learning Assessment:  Who is the primary learner? Patient    What is the preferred language for health care of the primary learner? ENGLISH    How does the primary learner prefer to learn new concepts? DEMONSTRATION    Answered By patient    Relationship to Learner SELF           Pt currently taking Anticoagulant therapy? no    Pt currently taking Antiplatelet therapy ? Aspirn 81 mg daily and brillinta 90 mg bid      Coordination of Care:  1. Have you been to the ER, urgent care clinic since your last visit? Hospitalized since your last visit? no    2. Have you seen or consulted any other health care providers outside of the [address] [place] [address] since your last visit? Include any pap smears or colon screening.  no

## 2023-09-27 NOTE — PATIENT INSTRUCTIONS
Continue present medication regimen  Possible loop recorder implant for Syncope  Follow-up with Dr. Mely Ta as scheduled and as needed

## 2023-10-11 RX ORDER — ROSUVASTATIN CALCIUM 20 MG/1
20 TABLET, COATED ORAL
Qty: 90 TABLET | Refills: 3 | Status: SHIPPED | OUTPATIENT
Start: 2023-10-11

## 2023-11-08 ENCOUNTER — PATIENT MESSAGE (OUTPATIENT)
Age: 78
End: 2023-11-08

## 2023-11-25 ASSESSMENT — ENCOUNTER SYMPTOMS
VOMITING: 0
CONSTIPATION: 0
SHORTNESS OF BREATH: 0
CHEST TIGHTNESS: 0
NAUSEA: 0
DIARRHEA: 0
ABDOMINAL DISTENTION: 0
COUGH: 0
WHEEZING: 0
BLOOD IN STOOL: 0

## 2023-11-30 ENCOUNTER — TELEPHONE (OUTPATIENT)
Age: 78
End: 2023-11-30

## 2023-11-30 ENCOUNTER — OFFICE VISIT (OUTPATIENT)
Age: 78
End: 2023-11-30
Payer: COMMERCIAL

## 2023-11-30 VITALS
HEART RATE: 57 BPM | BODY MASS INDEX: 29.62 KG/M2 | SYSTOLIC BLOOD PRESSURE: 136 MMHG | DIASTOLIC BLOOD PRESSURE: 72 MMHG | WEIGHT: 200 LBS | OXYGEN SATURATION: 98 % | HEIGHT: 69 IN

## 2023-11-30 DIAGNOSIS — I25.10 CORONARY ARTERY DISEASE INVOLVING NATIVE CORONARY ARTERY OF NATIVE HEART WITHOUT ANGINA PECTORIS: ICD-10-CM

## 2023-11-30 DIAGNOSIS — E11.22 TYPE 2 DIABETES MELLITUS WITH CHRONIC KIDNEY DISEASE, WITHOUT LONG-TERM CURRENT USE OF INSULIN, UNSPECIFIED CKD STAGE (HCC): ICD-10-CM

## 2023-11-30 DIAGNOSIS — R55 SYNCOPE AND COLLAPSE: Primary | ICD-10-CM

## 2023-11-30 DIAGNOSIS — I10 ESSENTIAL (PRIMARY) HYPERTENSION: ICD-10-CM

## 2023-11-30 DIAGNOSIS — E78.2 MIXED HYPERLIPIDEMIA: ICD-10-CM

## 2023-11-30 PROCEDURE — 93000 ELECTROCARDIOGRAM COMPLETE: CPT | Performed by: NURSE PRACTITIONER

## 2023-11-30 PROCEDURE — 3075F SYST BP GE 130 - 139MM HG: CPT | Performed by: NURSE PRACTITIONER

## 2023-11-30 PROCEDURE — 1123F ACP DISCUSS/DSCN MKR DOCD: CPT | Performed by: NURSE PRACTITIONER

## 2023-11-30 PROCEDURE — 99214 OFFICE O/P EST MOD 30 MIN: CPT | Performed by: NURSE PRACTITIONER

## 2023-11-30 PROCEDURE — 3078F DIAST BP <80 MM HG: CPT | Performed by: NURSE PRACTITIONER

## 2023-11-30 ASSESSMENT — PATIENT HEALTH QUESTIONNAIRE - PHQ9
7. TROUBLE CONCENTRATING ON THINGS, SUCH AS READING THE NEWSPAPER OR WATCHING TELEVISION: 0
5. POOR APPETITE OR OVEREATING: 0
SUM OF ALL RESPONSES TO PHQ9 QUESTIONS 1 & 2: 0
SUM OF ALL RESPONSES TO PHQ QUESTIONS 1-9: 0
10. IF YOU CHECKED OFF ANY PROBLEMS, HOW DIFFICULT HAVE THESE PROBLEMS MADE IT FOR YOU TO DO YOUR WORK, TAKE CARE OF THINGS AT HOME, OR GET ALONG WITH OTHER PEOPLE: 0
9. THOUGHTS THAT YOU WOULD BE BETTER OFF DEAD, OR OF HURTING YOURSELF: 0
SUM OF ALL RESPONSES TO PHQ QUESTIONS 1-9: 0
6. FEELING BAD ABOUT YOURSELF - OR THAT YOU ARE A FAILURE OR HAVE LET YOURSELF OR YOUR FAMILY DOWN: 0
SUM OF ALL RESPONSES TO PHQ QUESTIONS 1-9: 0
8. MOVING OR SPEAKING SO SLOWLY THAT OTHER PEOPLE COULD HAVE NOTICED. OR THE OPPOSITE, BEING SO FIGETY OR RESTLESS THAT YOU HAVE BEEN MOVING AROUND A LOT MORE THAN USUAL: 0
3. TROUBLE FALLING OR STAYING ASLEEP: 0
2. FEELING DOWN, DEPRESSED OR HOPELESS: 0
1. LITTLE INTEREST OR PLEASURE IN DOING THINGS: 0
4. FEELING TIRED OR HAVING LITTLE ENERGY: 0
SUM OF ALL RESPONSES TO PHQ QUESTIONS 1-9: 0

## 2023-11-30 NOTE — PROGRESS NOTES
Lucía Floyd presents today for   Chief Complaint   Patient presents with    Follow-up     Update h&p for loop implant       Syliva Sole III preferred language for health care discussion is english/other. Is someone accompanying this pt? no    Is the patient using any DME equipment during OV? no    Depression Screening:  Depression: Not at risk (11/30/2023)    PHQ-2     PHQ-2 Score: 0        Learning Assessment:  Who is the primary learner? Patient    What is the preferred language for health care of the primary learner? ENGLISH    How does the primary learner prefer to learn new concepts? DEMONSTRATION    Answered By patient    Relationship to Learner SELF           Pt currently taking Anticoagulant therapy? brilinta    Pt currently taking Antiplatelet therapy ? aspirin      Coordination of Care:  1. Have you been to the ER, urgent care clinic since your last visit? Hospitalized since your last visit? no    2. Have you seen or consulted any other health care providers outside of the [address] [place] [address] since your last visit? Include any pap smears or colon screening.  no
discussed with him and his questions were answered. He is ready to proceed. Pre-procedure instructions were reviewed with him by one of our nurses. He has been feeling well and has had no syncopal episodes since the one that occurred in Aug. 2023. Time: 30  minutes    He will follow-up with Dr. Martell Ford as scheduled and as needed. Bautista Mustafa MSN, FNP-BC    Please note:  Portions of this chart were created with Dragon medical speech to text program.  Unrecognized errors may be present.

## 2023-11-30 NOTE — TELEPHONE ENCOUNTER
Called pt to see what date he worked for him for his loop implant.  Pt will come in today for his H&P.

## 2023-11-30 NOTE — PATIENT INSTRUCTIONS
Loop recorder implant as scheduled  Follow-up with Dr Alicia Nam as scheduled and as needed    DR. [facility]   Patient  EP Instructions  [address], [id number] [place] [address]                You are scheduled to have a Loop implant  on  December 13, 2023. YOU WILL BE CALLED 24-48 HOURS PRIOR TO YOUR PROCEDURE. Please go to DR. CURTIS PERERA and park in the outpatient parking lot that is located around to the back of the hospital and enter through the [facility]. Once you enter through the [facility] check in with the  there. The  will either give you directions or assist you in getting to the cath holding area. 3.  You are not to eat or drink anything after midnight the night before your procedure. Please continue to take your medications with a small sip of water on the morning of the procedure with the following exceptions: [address] [place] [address],[address]. If you are diabetic, do not take your insulin/sugar pill the morning of the procedure. We encourage families to wait in the waiting room on the first floor while the procedure is being done. The Doctor will come out and talk with you as soon as the procedure is over. You will not be able to drive yourself home after your procedure is done. There is the possibility that you may spend the night in the hospital, depending on the results of the procedure. This will be determined after the procedure is done. 8.   If you or your family have any questions, please call our office Monday-Friday 8:30 am - 4:30 pm , at [phone], and ask to speak to one of the nurses. Please have labs completed 7 days prior to your procedure. Pt verbalized understanding. Post Implant Instructions for Pacemakers,AICD, and BIV devices. You may remove the big bulky bandage from the incision site after 24-48 hours. Keep the incision clean and dry for another 2 days.

## 2023-12-06 ENCOUNTER — HOSPITAL ENCOUNTER (OUTPATIENT)
Facility: HOSPITAL | Age: 78
Discharge: HOME | End: 2023-12-09
Payer: COMMERCIAL

## 2023-12-06 DIAGNOSIS — I25.10 CORONARY ARTERY DISEASE INVOLVING NATIVE CORONARY ARTERY OF NATIVE HEART WITHOUT ANGINA PECTORIS: ICD-10-CM

## 2023-12-06 DIAGNOSIS — R55 SYNCOPE AND COLLAPSE: ICD-10-CM

## 2023-12-06 LAB
ALBUMIN SERPL-MCNC: 3 G/DL (ref 3.4–5)
ALBUMIN/GLOB SERPL: 0.9 (ref 0.8–1.7)
ALP SERPL-CCNC: 60 U/L (ref 45–117)
ALT SERPL-CCNC: 27 U/L (ref 16–61)
ANION GAP SERPL CALC-SCNC: 5 MMOL/L (ref 3–18)
AST SERPL-CCNC: 19 U/L (ref 10–38)
BILIRUB SERPL-MCNC: 0.3 MG/DL (ref 0.2–1)
BUN SERPL-MCNC: 34 MG/DL (ref 7–18)
BUN/CREAT SERPL: 14 (ref 12–20)
CALCIUM SERPL-MCNC: 9 MG/DL (ref 8.5–10.1)
CHLORIDE SERPL-SCNC: 115 MMOL/L (ref 100–111)
CO2 SERPL-SCNC: 24 MMOL/L (ref 21–32)
CREAT SERPL-MCNC: 2.48 MG/DL (ref 0.6–1.3)
ERYTHROCYTE [DISTWIDTH] IN BLOOD BY AUTOMATED COUNT: 13.1 % (ref 11.6–14.5)
GLOBULIN SER CALC-MCNC: 3.5 G/DL (ref 2–4)
GLUCOSE SERPL-MCNC: 156 MG/DL (ref 74–99)
HCT VFR BLD AUTO: 33.4 % (ref 36–48)
HGB BLD-MCNC: 10.9 G/DL (ref 13–16)
INR PPP: 1 (ref 0.9–1.1)
MCH RBC QN AUTO: 29.2 PG (ref 24–34)
MCHC RBC AUTO-ENTMCNC: 32.6 G/DL (ref 31–37)
MCV RBC AUTO: 89.5 FL (ref 78–100)
NRBC # BLD: 0 K/UL (ref 0–0.01)
NRBC BLD-RTO: 0 PER 100 WBC
PLATELET # BLD AUTO: 202 K/UL (ref 135–420)
PMV BLD AUTO: 9.7 FL (ref 9.2–11.8)
POTASSIUM SERPL-SCNC: 4.1 MMOL/L (ref 3.5–5.5)
PROT SERPL-MCNC: 6.5 G/DL (ref 6.4–8.2)
PROTHROMBIN TIME: 13.7 SEC (ref 11.9–14.7)
RBC # BLD AUTO: 3.73 M/UL (ref 4.35–5.65)
SODIUM SERPL-SCNC: 144 MMOL/L (ref 136–145)
WBC # BLD AUTO: 7.5 K/UL (ref 4.6–13.2)

## 2023-12-06 PROCEDURE — 71046 X-RAY EXAM CHEST 2 VIEWS: CPT

## 2023-12-06 PROCEDURE — 36415 COLL VENOUS BLD VENIPUNCTURE: CPT

## 2023-12-06 PROCEDURE — 85027 COMPLETE CBC AUTOMATED: CPT

## 2023-12-06 PROCEDURE — 85610 PROTHROMBIN TIME: CPT

## 2023-12-06 PROCEDURE — 80053 COMPREHEN METABOLIC PANEL: CPT

## 2023-12-13 ENCOUNTER — HOSPITAL ENCOUNTER (OUTPATIENT)
Facility: HOSPITAL | Age: 78
Setting detail: OUTPATIENT SURGERY
Discharge: HOME | End: 2023-12-13
Attending: INTERNAL MEDICINE | Admitting: INTERNAL MEDICINE
Payer: COMMERCIAL

## 2023-12-13 VITALS
OXYGEN SATURATION: 99 % | DIASTOLIC BLOOD PRESSURE: 74 MMHG | SYSTOLIC BLOOD PRESSURE: 165 MMHG | HEART RATE: 60 BPM | WEIGHT: 193 LBS | TEMPERATURE: 98.4 F | HEIGHT: 69 IN | BODY MASS INDEX: 28.58 KG/M2 | RESPIRATION RATE: 14 BRPM

## 2023-12-13 DIAGNOSIS — R55 SYNCOPE, UNSPECIFIED SYNCOPE TYPE: ICD-10-CM

## 2023-12-13 LAB — ECHO BSA: 2.06 M2

## 2023-12-13 PROCEDURE — 2709999900 HC NON-CHARGEABLE SUPPLY: Performed by: INTERNAL MEDICINE

## 2023-12-13 PROCEDURE — 2500000003 HC RX 250 WO HCPCS: Performed by: INTERNAL MEDICINE

## 2023-12-13 PROCEDURE — C1764 EVENT RECORDER, CARDIAC: HCPCS | Performed by: INTERNAL MEDICINE

## 2023-12-13 DEVICE — ICM LNQ22 LINQ II USA
Type: IMPLANTABLE DEVICE | Status: FUNCTIONAL
Brand: LINQ II™

## 2023-12-13 RX ORDER — LIDOCAINE HYDROCHLORIDE AND EPINEPHRINE 10; 10 MG/ML; UG/ML
INJECTION, SOLUTION INFILTRATION; PERINEURAL PRN
Status: DISCONTINUED | OUTPATIENT
Start: 2023-12-13 | End: 2023-12-13 | Stop reason: HOSPADM

## 2023-12-13 ASSESSMENT — PAIN - FUNCTIONAL ASSESSMENT
PAIN_FUNCTIONAL_ASSESSMENT: NONE - DENIES PAIN

## 2023-12-13 NOTE — PROGRESS NOTES
Patient arrived on unit ambulatory and alert times 4. Patient denies pain of any kind at this time. Patient hooked up to monitor awaiting procedure. Nurse Delaney Blount Mode notified of findings and verbalized understanding.

## 2023-12-13 NOTE — H&P
HPI  Patient presents for a follow-up office visit. Patient has a past medical history significant for paroxysmal supraventricular tachycardia, hypertension, dyslipidemia and diabetes. He also has a history of atypical chest pain and a borderline abnormal stress test in 2010. He was seen again in the emergency department in Feb. 2013 for chest pain, which led to a negative exercise treadmill stress test at 6 minutes 0 seconds total exercise time. The patient last underwent a follow-up exercise treadmill stress test for recurrent atypical chest pain in December 2016 which was a normal and low risk study. He underwent a carotid duplex scan in November 2019 which did not show any significant obstructive disease. He only had mild bilateral plaquing in both internal carotid arteries. Patient underwent a pharmacological nuclear stress test in July 2022 which suggested single-vessel disease with a partially reversible inferolateral perfusion defect and preserved LV function, EF 63%. He then started complaining of increased exertional chest pain consistent with CCS class II angina, so he underwent a cardiac catheterization in September 2022 which revealed severe high-grade two-vessel CAD involving his left circumflex/obtuse marginal branch and also his LAD. He underwent PCI with 2 drug-eluting stents to his left circumflex by our interventional cardiologist with plan for staged PCI to his LAD. In October 2022, he underwent a scheduled PCI to both his proximal LAD lesion in his distal LAD lesion using a total of 2 drug-eluting stents. He  had a single syncopal episode which occurred in January of this year. He states that he as standing in a hot room in his sister's house for at least 60 minutes, wearing a winter coat discussing a contract. He is suddenly became dizzy flushed and noted a wave of blackness, room and then he collapsed briefly for less than 30 seconds.   He states he felt back to baseline

## 2024-01-05 ASSESSMENT — ENCOUNTER SYMPTOMS
ABDOMINAL DISTENTION: 0
DIARRHEA: 0
VOMITING: 0
CONSTIPATION: 0
COUGH: 0
SHORTNESS OF BREATH: 0
NAUSEA: 0
CHEST TIGHTNESS: 0
BLOOD IN STOOL: 0
WHEEZING: 0

## 2024-01-05 NOTE — PROGRESS NOTES
Michele Avalos III presents today for a post-loop recorder implant.  The device was implanted on 12/13/23, after a 30 day event monitor did not reveal any possible causes for syncope.  He had 2 syncopal episodes in 2023 (one in Jan and the other in August 2023). He has been feeling well and states that he has not had any further syncopal episodes.  He states that he had a tooth extracted yesterday and is having some pain and he also states that he has been very busy today putting together some furniture and had to rush to get here for his appointment.     He is a 78 year old male with history of paroxysmal supraventricular tachycardia, hypertension, dyslipidemia and diabetes.  He also has a history of atypical chest pain and a borderline abnormal stress test in 2010.  He had an echo done in Dec. 2021 when he was hospitalized for food poisoning and it showed an EF 55 to 60%, grade 1 diastolic dysfunction, mild LVH with normal PA pressures and no valvular heart disease.     He was hospitalized at [facility] from 8/3/23 through 8/5/23 after a syncopal episode.  He states that he was talking to a neighbor in his garage when he told his friend that he was going to pass out.  He states that he was \"out\" a few seconds. He had complete work-up done at  (carotid duplex showed <50% stenosis of both the right and left ICAs),  echo (structurally normal), EKG, CT of the head, orthostatics, telemetry monitoring were all negative).  He was discharged home with a 30 day event monitor which he wore through 92/23).  His  event monitor showed some 1st degree AV block with the slowest rate being 55 bpm.. PAC and PVC burden (both <1%).  No pauses or arrhythmias noted.      Michele Avalos III is a 78 y.o. male presents today for :  Chief Complaint   Patient presents with    Follow-up     Post loop recorder implant       PMH:  Past Medical History:   Diagnosis Date    Bladder outlet obstruction     BPH (benign prostatic hyperplasia)

## 2024-01-09 ENCOUNTER — OFFICE VISIT (OUTPATIENT)
Age: 79
End: 2024-01-09
Payer: COMMERCIAL

## 2024-01-09 VITALS
HEART RATE: 54 BPM | WEIGHT: 199 LBS | SYSTOLIC BLOOD PRESSURE: 170 MMHG | OXYGEN SATURATION: 98 % | BODY MASS INDEX: 29.47 KG/M2 | DIASTOLIC BLOOD PRESSURE: 70 MMHG | HEIGHT: 69 IN

## 2024-01-09 DIAGNOSIS — G47.33 OBSTRUCTIVE SLEEP APNEA (ADULT) (PEDIATRIC): ICD-10-CM

## 2024-01-09 DIAGNOSIS — I10 ESSENTIAL (PRIMARY) HYPERTENSION: Primary | ICD-10-CM

## 2024-01-09 DIAGNOSIS — R07.9 CHEST PAIN, UNSPECIFIED TYPE: ICD-10-CM

## 2024-01-09 DIAGNOSIS — E78.2 MIXED HYPERLIPIDEMIA: ICD-10-CM

## 2024-01-09 DIAGNOSIS — I25.10 CORONARY ARTERY DISEASE INVOLVING NATIVE CORONARY ARTERY OF NATIVE HEART WITHOUT ANGINA PECTORIS: ICD-10-CM

## 2024-01-09 DIAGNOSIS — R55 SYNCOPE, UNSPECIFIED SYNCOPE TYPE: ICD-10-CM

## 2024-01-09 PROCEDURE — 3077F SYST BP >= 140 MM HG: CPT | Performed by: NURSE PRACTITIONER

## 2024-01-09 PROCEDURE — 3078F DIAST BP <80 MM HG: CPT | Performed by: NURSE PRACTITIONER

## 2024-01-09 PROCEDURE — 1123F ACP DISCUSS/DSCN MKR DOCD: CPT | Performed by: NURSE PRACTITIONER

## 2024-01-09 PROCEDURE — 99214 OFFICE O/P EST MOD 30 MIN: CPT | Performed by: NURSE PRACTITIONER

## 2024-01-09 PROCEDURE — 93000 ELECTROCARDIOGRAM COMPLETE: CPT | Performed by: NURSE PRACTITIONER

## 2024-01-09 ASSESSMENT — PATIENT HEALTH QUESTIONNAIRE - PHQ9
SUM OF ALL RESPONSES TO PHQ QUESTIONS 1-9: 0
8. MOVING OR SPEAKING SO SLOWLY THAT OTHER PEOPLE COULD HAVE NOTICED. OR THE OPPOSITE, BEING SO FIGETY OR RESTLESS THAT YOU HAVE BEEN MOVING AROUND A LOT MORE THAN USUAL: 0
9. THOUGHTS THAT YOU WOULD BE BETTER OFF DEAD, OR OF HURTING YOURSELF: 0
3. TROUBLE FALLING OR STAYING ASLEEP: 0
SUM OF ALL RESPONSES TO PHQ9 QUESTIONS 1 & 2: 0
2. FEELING DOWN, DEPRESSED OR HOPELESS: 0
SUM OF ALL RESPONSES TO PHQ QUESTIONS 1-9: 0
1. LITTLE INTEREST OR PLEASURE IN DOING THINGS: 0
6. FEELING BAD ABOUT YOURSELF - OR THAT YOU ARE A FAILURE OR HAVE LET YOURSELF OR YOUR FAMILY DOWN: 0
10. IF YOU CHECKED OFF ANY PROBLEMS, HOW DIFFICULT HAVE THESE PROBLEMS MADE IT FOR YOU TO DO YOUR WORK, TAKE CARE OF THINGS AT HOME, OR GET ALONG WITH OTHER PEOPLE: 0
SUM OF ALL RESPONSES TO PHQ QUESTIONS 1-9: 0
5. POOR APPETITE OR OVEREATING: 0
7. TROUBLE CONCENTRATING ON THINGS, SUCH AS READING THE NEWSPAPER OR WATCHING TELEVISION: 0
4. FEELING TIRED OR HAVING LITTLE ENERGY: 0
SUM OF ALL RESPONSES TO PHQ QUESTIONS 1-9: 0

## 2024-01-09 NOTE — PROGRESS NOTES
Michele Avalos III presents today for   Chief Complaint   Patient presents with    Follow-up     Post cath        Michele Avalos III preferred language for health care discussion is english/other.    Is someone accompanying this pt? no    Is the patient using any DME equipment during OV? no    Depression Screening:  Depression: Not at risk (1/9/2024)    PHQ-2     PHQ-2 Score: 0        Learning Assessment:  Who is the primary learner? Patient    What is the preferred language for health care of the primary learner? ENGLISH    How does the primary learner prefer to learn new concepts? DEMONSTRATION    Answered By patient    Relationship to Learner SELF           Pt currently taking Anticoagulant therapy? no    Pt currently taking Antiplatelet therapy ? ASA 81 mg      Coordination of Care:  1. Have you been to the ER, urgent care clinic since your last visit? Hospitalized since your last visit? no    2. Have you seen or consulted any other health care providers outside of the [facility] since your last visit? Include any pap smears or colon screening. no

## 2024-01-09 NOTE — PATIENT INSTRUCTIONS
Monitor and record blood pressures at home. If BP are consistently greater than 140/90, please call the office.  Continue present medication regimen  Follow-up with Dr. Denny as scheduled and as needed

## 2024-01-11 ENCOUNTER — HOSPITAL ENCOUNTER (OUTPATIENT)
Facility: HOSPITAL | Age: 79
Discharge: HOME | End: 2024-01-11
Payer: COMMERCIAL

## 2024-01-11 LAB
25(OH)D3 SERPL-MCNC: 47.9 NG/ML (ref 30–100)
ALBUMIN SERPL-MCNC: 2.6 G/DL (ref 3.4–5)
ANION GAP SERPL CALC-SCNC: 6 MMOL/L (ref 3–18)
BUN SERPL-MCNC: 31 MG/DL (ref 7–18)
BUN/CREAT SERPL: 12 (ref 12–20)
CALCIUM SERPL-MCNC: 8.6 MG/DL (ref 8.5–10.1)
CALCIUM SERPL-MCNC: 8.8 MG/DL (ref 8.5–10.1)
CHLORIDE SERPL-SCNC: 113 MMOL/L (ref 100–111)
CO2 SERPL-SCNC: 24 MMOL/L (ref 21–32)
CREAT SERPL-MCNC: 2.54 MG/DL (ref 0.6–1.3)
CREAT UR-MCNC: 90 MG/DL (ref 30–125)
FERRITIN SERPL-MCNC: 240 NG/ML (ref 8–388)
GLUCOSE SERPL-MCNC: 153 MG/DL (ref 74–99)
HBA1C MFR BLD: 6.6 % (ref 4.2–5.6)
HCT VFR BLD AUTO: 35 % (ref 36–48)
HGB BLD-MCNC: 11.1 G/DL (ref 13–16)
IRON SATN MFR SERPL: 30 % (ref 20–50)
IRON SERPL-MCNC: 58 UG/DL (ref 50–175)
MICROALBUMIN UR-MCNC: 357 MG/DL (ref 0–3)
MICROALBUMIN/CREAT UR-RTO: 3967 MG/G (ref 0–30)
PHOSPHATE SERPL-MCNC: 3.9 MG/DL (ref 2.5–4.9)
POTASSIUM SERPL-SCNC: 4.1 MMOL/L (ref 3.5–5.5)
PTH-INTACT SERPL-MCNC: 119.1 PG/ML (ref 18.4–88)
SODIUM SERPL-SCNC: 143 MMOL/L (ref 136–145)
TIBC SERPL-MCNC: 196 UG/DL (ref 250–450)

## 2024-01-11 PROCEDURE — 82306 VITAMIN D 25 HYDROXY: CPT

## 2024-01-11 PROCEDURE — 83036 HEMOGLOBIN GLYCOSYLATED A1C: CPT

## 2024-01-11 PROCEDURE — 83540 ASSAY OF IRON: CPT

## 2024-01-11 PROCEDURE — 85014 HEMATOCRIT: CPT

## 2024-01-11 PROCEDURE — 36415 COLL VENOUS BLD VENIPUNCTURE: CPT

## 2024-01-11 PROCEDURE — 83550 IRON BINDING TEST: CPT

## 2024-01-11 PROCEDURE — 82728 ASSAY OF FERRITIN: CPT

## 2024-01-11 PROCEDURE — 83970 ASSAY OF PARATHORMONE: CPT

## 2024-01-11 PROCEDURE — 80069 RENAL FUNCTION PANEL: CPT

## 2024-01-11 PROCEDURE — 85018 HEMOGLOBIN: CPT

## 2024-01-11 PROCEDURE — 82043 UR ALBUMIN QUANTITATIVE: CPT

## 2024-01-11 PROCEDURE — 82570 ASSAY OF URINE CREATININE: CPT

## 2024-02-05 ENCOUNTER — OFFICE VISIT (OUTPATIENT)
Age: 79
End: 2024-02-05
Payer: COMMERCIAL

## 2024-02-05 VITALS
HEIGHT: 69 IN | OXYGEN SATURATION: 98 % | SYSTOLIC BLOOD PRESSURE: 138 MMHG | BODY MASS INDEX: 29.33 KG/M2 | HEART RATE: 54 BPM | DIASTOLIC BLOOD PRESSURE: 66 MMHG | WEIGHT: 198 LBS

## 2024-02-05 DIAGNOSIS — I25.10 CORONARY ARTERY DISEASE INVOLVING NATIVE CORONARY ARTERY OF NATIVE HEART WITHOUT ANGINA PECTORIS: ICD-10-CM

## 2024-02-05 DIAGNOSIS — E78.2 MIXED HYPERLIPIDEMIA: ICD-10-CM

## 2024-02-05 DIAGNOSIS — Z95.9 CARDIAC DEVICE IN SITU: Primary | ICD-10-CM

## 2024-02-05 DIAGNOSIS — I10 ESSENTIAL (PRIMARY) HYPERTENSION: ICD-10-CM

## 2024-02-05 DIAGNOSIS — R55 SYNCOPE, UNSPECIFIED SYNCOPE TYPE: ICD-10-CM

## 2024-02-05 PROCEDURE — 1123F ACP DISCUSS/DSCN MKR DOCD: CPT | Performed by: INTERNAL MEDICINE

## 2024-02-05 PROCEDURE — 99214 OFFICE O/P EST MOD 30 MIN: CPT | Performed by: INTERNAL MEDICINE

## 2024-02-05 PROCEDURE — 3078F DIAST BP <80 MM HG: CPT | Performed by: INTERNAL MEDICINE

## 2024-02-05 PROCEDURE — 93285 PRGRMG DEV EVAL SCRMS IP: CPT | Performed by: INTERNAL MEDICINE

## 2024-02-05 PROCEDURE — 3075F SYST BP GE 130 - 139MM HG: CPT | Performed by: INTERNAL MEDICINE

## 2024-02-05 ASSESSMENT — ENCOUNTER SYMPTOMS
SORE THROAT: 0
COUGH: 0
VOMITING: 0
NAUSEA: 0
ABDOMINAL PAIN: 0
ABDOMINAL DISTENTION: 0
SHORTNESS OF BREATH: 0

## 2024-02-05 ASSESSMENT — PATIENT HEALTH QUESTIONNAIRE - PHQ9
6. FEELING BAD ABOUT YOURSELF - OR THAT YOU ARE A FAILURE OR HAVE LET YOURSELF OR YOUR FAMILY DOWN: 0
SUM OF ALL RESPONSES TO PHQ QUESTIONS 1-9: 0
4. FEELING TIRED OR HAVING LITTLE ENERGY: 0
9. THOUGHTS THAT YOU WOULD BE BETTER OFF DEAD, OR OF HURTING YOURSELF: 0
SUM OF ALL RESPONSES TO PHQ QUESTIONS 1-9: 0
1. LITTLE INTEREST OR PLEASURE IN DOING THINGS: 0
SUM OF ALL RESPONSES TO PHQ9 QUESTIONS 1 & 2: 0
3. TROUBLE FALLING OR STAYING ASLEEP: 0
10. IF YOU CHECKED OFF ANY PROBLEMS, HOW DIFFICULT HAVE THESE PROBLEMS MADE IT FOR YOU TO DO YOUR WORK, TAKE CARE OF THINGS AT HOME, OR GET ALONG WITH OTHER PEOPLE: 0
SUM OF ALL RESPONSES TO PHQ QUESTIONS 1-9: 0
SUM OF ALL RESPONSES TO PHQ QUESTIONS 1-9: 0
8. MOVING OR SPEAKING SO SLOWLY THAT OTHER PEOPLE COULD HAVE NOTICED. OR THE OPPOSITE, BEING SO FIGETY OR RESTLESS THAT YOU HAVE BEEN MOVING AROUND A LOT MORE THAN USUAL: 0
5. POOR APPETITE OR OVEREATING: 0
7. TROUBLE CONCENTRATING ON THINGS, SUCH AS READING THE NEWSPAPER OR WATCHING TELEVISION: 0
2. FEELING DOWN, DEPRESSED OR HOPELESS: 0

## 2024-02-05 NOTE — PROGRESS NOTES
Michele Avalos III presents today for   Chief Complaint   Patient presents with    Follow-up     6 month f/u with no concerns        Michele Bailey CASTLE preferred language for health care discussion is english/other.    Is someone accompanying this pt? no    Is the patient using any DME equipment during OV? no    Depression Screening:  Depression: Not at risk (2/5/2024)    PHQ-2     PHQ-2 Score: 0        Learning Assessment:  Who is the primary learner? Patient    What is the preferred language for health care of the primary learner? ENGLISH    How does the primary learner prefer to learn new concepts? DEMONSTRATION    Answered By patient    Relationship to Learner SELF           Pt currently taking Anticoagulant therapy? no    Pt currently taking Antiplatelet therapy ? ASA 81 mg 1x daily       Coordination of Care:  1. Have you been to the ER, urgent care clinic since your last visit? Hospitalized since your last visit? yes    2. Have you seen or consulted any other health care providers outside of the [facility] since your last visit? Include any pap smears or colon screening. no    
sore throat.    Eyes:  Negative for visual disturbance.   Respiratory:  Negative for cough and shortness of breath.    Cardiovascular:  Negative for chest pain, palpitations and leg swelling.   Gastrointestinal:  Negative for abdominal distention, abdominal pain, nausea and vomiting.   Endocrine: Negative for cold intolerance and heat intolerance.   Genitourinary:  Negative for dysuria.   Musculoskeletal:  Negative for arthralgias.   Skin:  Negative for rash.   Neurological:  Negative for dizziness, syncope, weakness and headaches.   Hematological:  Does not bruise/bleed easily.   Psychiatric/Behavioral:  Negative for suicidal ideas.          /66 (Site: Left Upper Arm, Position: Sitting, Cuff Size: Small Adult)   Pulse 54   Ht 1.753 m (5' 9\")   Wt 89.8 kg (198 lb)   SpO2 98%   BMI 29.24 kg/m²     Objective:   Physical Exam  Constitutional:       General: He is not in acute distress.  HENT:      Head: Normocephalic.   Neck:      Vascular: No carotid bruit or JVD.   Cardiovascular:      Rate and Rhythm: Normal rate and regular rhythm. No extrasystoles are present.     Heart sounds: No murmur heard.     No gallop.   Pulmonary:      Effort: Pulmonary effort is normal.      Breath sounds: No wheezing, rhonchi or rales.   Abdominal:      General: Bowel sounds are normal. There is no distension.      Palpations: Abdomen is soft.      Tenderness: There is no abdominal tenderness.   Musculoskeletal:         General: No swelling or deformity.   Skin:     General: Skin is warm and dry.      Findings: No rash.   Neurological:      General: No focal deficit present.      Mental Status: He is alert and oriented to person, place, and time.   Psychiatric:         Mood and Affect: Mood normal.         Behavior: Behavior normal.         Medtronic Linq 2 implantable loop monitor interrogation: Normal function.  Battery life good, appropriate sensing.  No events or arrhythmias since device implant.  Scanned document for

## 2024-02-22 ENCOUNTER — HOSPITAL ENCOUNTER (OUTPATIENT)
Facility: HOSPITAL | Age: 79
Discharge: HOME | End: 2024-02-22
Payer: COMMERCIAL

## 2024-02-22 LAB
ALBUMIN SERPL-MCNC: 2.8 G/DL (ref 3.4–5)
ANION GAP SERPL CALC-SCNC: 1 MMOL/L (ref 3–18)
BUN SERPL-MCNC: 45 MG/DL (ref 7–18)
BUN/CREAT SERPL: 16 (ref 12–20)
CALCIUM SERPL-MCNC: 8.9 MG/DL (ref 8.5–10.1)
CHLORIDE SERPL-SCNC: 114 MMOL/L (ref 100–111)
CO2 SERPL-SCNC: 23 MMOL/L (ref 21–32)
CREAT SERPL-MCNC: 2.88 MG/DL (ref 0.6–1.3)
CREAT UR-MCNC: 98 MG/DL (ref 30–125)
GLUCOSE SERPL-MCNC: 154 MG/DL (ref 74–99)
MICROALBUMIN UR-MCNC: 272 MG/DL (ref 0–3)
MICROALBUMIN/CREAT UR-RTO: 2776 MG/G (ref 0–30)
PHOSPHATE SERPL-MCNC: 3.6 MG/DL (ref 2.5–4.9)
POTASSIUM SERPL-SCNC: 5.3 MMOL/L (ref 3.5–5.5)
SODIUM SERPL-SCNC: 138 MMOL/L (ref 136–145)

## 2024-02-22 PROCEDURE — 36415 COLL VENOUS BLD VENIPUNCTURE: CPT

## 2024-02-22 PROCEDURE — 82570 ASSAY OF URINE CREATININE: CPT

## 2024-02-22 PROCEDURE — 82043 UR ALBUMIN QUANTITATIVE: CPT

## 2024-02-22 PROCEDURE — 80069 RENAL FUNCTION PANEL: CPT

## 2024-04-08 ENCOUNTER — HOSPITAL ENCOUNTER (OUTPATIENT)
Facility: HOSPITAL | Age: 79
Discharge: HOME | End: 2024-04-11
Payer: COMMERCIAL

## 2024-04-08 LAB
25(OH)D3 SERPL-MCNC: 38.9 NG/ML (ref 30–100)
ALBUMIN SERPL-MCNC: 2.9 G/DL (ref 3.4–5)
ANION GAP SERPL CALC-SCNC: 5 MMOL/L (ref 3–18)
BUN SERPL-MCNC: 51 MG/DL (ref 7–18)
BUN/CREAT SERPL: 17 (ref 12–20)
CALCIUM SERPL-MCNC: 8.8 MG/DL (ref 8.5–10.1)
CALCIUM SERPL-MCNC: 9.2 MG/DL (ref 8.5–10.1)
CHLORIDE SERPL-SCNC: 114 MMOL/L (ref 100–111)
CO2 SERPL-SCNC: 21 MMOL/L (ref 21–32)
CREAT SERPL-MCNC: 2.98 MG/DL (ref 0.6–1.3)
CREAT UR-MCNC: 79 MG/DL (ref 30–125)
FERRITIN SERPL-MCNC: 244 NG/ML (ref 8–388)
GLUCOSE SERPL-MCNC: 164 MG/DL (ref 74–99)
HCT VFR BLD AUTO: 34.8 % (ref 36–48)
HGB BLD-MCNC: 10.9 G/DL (ref 13–16)
IRON SATN MFR SERPL: 33 % (ref 20–50)
IRON SERPL-MCNC: 73 UG/DL (ref 50–175)
MICROALBUMIN UR-MCNC: 215 MG/DL (ref 0–3)
MICROALBUMIN/CREAT UR-RTO: 2722 MG/G (ref 0–30)
PHOSPHATE SERPL-MCNC: 4.3 MG/DL (ref 2.5–4.9)
POTASSIUM SERPL-SCNC: 5.4 MMOL/L (ref 3.5–5.5)
PTH-INTACT SERPL-MCNC: 112.9 PG/ML (ref 18.4–88)
SODIUM SERPL-SCNC: 140 MMOL/L (ref 136–145)
TIBC SERPL-MCNC: 224 UG/DL (ref 250–450)

## 2024-04-08 PROCEDURE — 80069 RENAL FUNCTION PANEL: CPT

## 2024-04-08 PROCEDURE — 83540 ASSAY OF IRON: CPT

## 2024-04-08 PROCEDURE — 82570 ASSAY OF URINE CREATININE: CPT

## 2024-04-08 PROCEDURE — 85018 HEMOGLOBIN: CPT

## 2024-04-08 PROCEDURE — 82728 ASSAY OF FERRITIN: CPT

## 2024-04-08 PROCEDURE — 83550 IRON BINDING TEST: CPT

## 2024-04-08 PROCEDURE — 85014 HEMATOCRIT: CPT

## 2024-04-08 PROCEDURE — 82306 VITAMIN D 25 HYDROXY: CPT

## 2024-04-08 PROCEDURE — 83970 ASSAY OF PARATHORMONE: CPT

## 2024-04-08 PROCEDURE — 36415 COLL VENOUS BLD VENIPUNCTURE: CPT

## 2024-04-08 PROCEDURE — 82043 UR ALBUMIN QUANTITATIVE: CPT

## 2024-05-15 ENCOUNTER — NURSE ONLY (OUTPATIENT)
Age: 79
End: 2024-05-15
Payer: COMMERCIAL

## 2024-05-15 DIAGNOSIS — R55 SYNCOPE, UNSPECIFIED SYNCOPE TYPE: ICD-10-CM

## 2024-05-15 DIAGNOSIS — Z95.9 CARDIAC DEVICE IN SITU: Primary | ICD-10-CM

## 2024-05-15 PROCEDURE — 93298 REM INTERROG DEV EVAL SCRMS: CPT | Performed by: INTERNAL MEDICINE

## 2024-05-20 ENCOUNTER — HOSPITAL ENCOUNTER (OUTPATIENT)
Facility: HOSPITAL | Age: 79
Discharge: HOME | End: 2024-05-23
Payer: COMMERCIAL

## 2024-05-20 LAB
ALBUMIN SERPL-MCNC: 2.7 G/DL (ref 3.4–5)
ANION GAP SERPL CALC-SCNC: 6 MMOL/L (ref 3–18)
BUN SERPL-MCNC: 34 MG/DL (ref 7–18)
BUN/CREAT SERPL: 12 (ref 12–20)
CALCIUM SERPL-MCNC: 8.9 MG/DL (ref 8.5–10.1)
CHLORIDE SERPL-SCNC: 110 MMOL/L (ref 100–111)
CO2 SERPL-SCNC: 23 MMOL/L (ref 21–32)
CREAT SERPL-MCNC: 2.75 MG/DL (ref 0.6–1.3)
CREAT UR-MCNC: 77 MG/DL (ref 30–125)
GLUCOSE SERPL-MCNC: 209 MG/DL (ref 74–99)
MICROALBUMIN UR-MCNC: 282 MG/DL (ref 0–3)
MICROALBUMIN/CREAT UR-RTO: 3662 MG/G (ref 0–30)
PHOSPHATE SERPL-MCNC: 3.9 MG/DL (ref 2.5–4.9)
POTASSIUM SERPL-SCNC: 4.2 MMOL/L (ref 3.5–5.5)
SODIUM SERPL-SCNC: 139 MMOL/L (ref 136–145)

## 2024-05-20 PROCEDURE — 80069 RENAL FUNCTION PANEL: CPT

## 2024-05-20 PROCEDURE — 82570 ASSAY OF URINE CREATININE: CPT

## 2024-05-20 PROCEDURE — 36415 COLL VENOUS BLD VENIPUNCTURE: CPT

## 2024-05-20 PROCEDURE — 82043 UR ALBUMIN QUANTITATIVE: CPT

## 2024-08-12 ENCOUNTER — OFFICE VISIT (OUTPATIENT)
Age: 79
End: 2024-08-12
Payer: COMMERCIAL

## 2024-08-12 VITALS
HEART RATE: 56 BPM | BODY MASS INDEX: 29.47 KG/M2 | HEIGHT: 69 IN | DIASTOLIC BLOOD PRESSURE: 78 MMHG | SYSTOLIC BLOOD PRESSURE: 164 MMHG | OXYGEN SATURATION: 98 % | WEIGHT: 199 LBS

## 2024-08-12 DIAGNOSIS — Z95.9 CARDIAC DEVICE IN SITU: Primary | ICD-10-CM

## 2024-08-12 DIAGNOSIS — I25.10 CORONARY ARTERY DISEASE INVOLVING NATIVE CORONARY ARTERY OF NATIVE HEART WITHOUT ANGINA PECTORIS: ICD-10-CM

## 2024-08-12 DIAGNOSIS — R55 SYNCOPE, UNSPECIFIED SYNCOPE TYPE: ICD-10-CM

## 2024-08-12 DIAGNOSIS — I10 ESSENTIAL (PRIMARY) HYPERTENSION: ICD-10-CM

## 2024-08-12 DIAGNOSIS — E78.2 MIXED HYPERLIPIDEMIA: ICD-10-CM

## 2024-08-12 PROCEDURE — 3077F SYST BP >= 140 MM HG: CPT | Performed by: INTERNAL MEDICINE

## 2024-08-12 PROCEDURE — 93000 ELECTROCARDIOGRAM COMPLETE: CPT | Performed by: INTERNAL MEDICINE

## 2024-08-12 PROCEDURE — 99214 OFFICE O/P EST MOD 30 MIN: CPT | Performed by: INTERNAL MEDICINE

## 2024-08-12 PROCEDURE — 3078F DIAST BP <80 MM HG: CPT | Performed by: INTERNAL MEDICINE

## 2024-08-12 PROCEDURE — 1123F ACP DISCUSS/DSCN MKR DOCD: CPT | Performed by: INTERNAL MEDICINE

## 2024-08-12 ASSESSMENT — PATIENT HEALTH QUESTIONNAIRE - PHQ9
SUM OF ALL RESPONSES TO PHQ QUESTIONS 1-9: 0
3. TROUBLE FALLING OR STAYING ASLEEP: NOT AT ALL
9. THOUGHTS THAT YOU WOULD BE BETTER OFF DEAD, OR OF HURTING YOURSELF: NOT AT ALL
4. FEELING TIRED OR HAVING LITTLE ENERGY: NOT AT ALL
SUM OF ALL RESPONSES TO PHQ QUESTIONS 1-9: 0
2. FEELING DOWN, DEPRESSED OR HOPELESS: NOT AT ALL
SUM OF ALL RESPONSES TO PHQ QUESTIONS 1-9: 0
1. LITTLE INTEREST OR PLEASURE IN DOING THINGS: NOT AT ALL
SUM OF ALL RESPONSES TO PHQ QUESTIONS 1-9: 0
5. POOR APPETITE OR OVEREATING: NOT AT ALL
7. TROUBLE CONCENTRATING ON THINGS, SUCH AS READING THE NEWSPAPER OR WATCHING TELEVISION: NOT AT ALL
SUM OF ALL RESPONSES TO PHQ9 QUESTIONS 1 & 2: 0
8. MOVING OR SPEAKING SO SLOWLY THAT OTHER PEOPLE COULD HAVE NOTICED. OR THE OPPOSITE, BEING SO FIGETY OR RESTLESS THAT YOU HAVE BEEN MOVING AROUND A LOT MORE THAN USUAL: NOT AT ALL
6. FEELING BAD ABOUT YOURSELF - OR THAT YOU ARE A FAILURE OR HAVE LET YOURSELF OR YOUR FAMILY DOWN: NOT AT ALL

## 2024-08-12 ASSESSMENT — ENCOUNTER SYMPTOMS
SORE THROAT: 0
ABDOMINAL DISTENTION: 0
VOMITING: 0
ABDOMINAL PAIN: 0
NAUSEA: 0
SHORTNESS OF BREATH: 0
COUGH: 0

## 2024-08-12 NOTE — PROGRESS NOTES
Michele Avalos III presents today for   Chief Complaint   Patient presents with    Follow-up     6 month f/u     Device Check     Medtronic     Edema     Bilateral ankle edema and Rt leg edema        Michele Avalos III preferred language for health care discussion is english/other.    Is someone accompanying this pt? no    Is the patient using any DME equipment during OV? no    Depression Screening:  Depression: Not at risk (8/12/2024)    PHQ-2     PHQ-2 Score: 0        Learning Assessment:  Who is the primary learner? Patient    What is the preferred language for health care of the primary learner? ENGLISH    How does the primary learner prefer to learn new concepts? DEMONSTRATION    Answered By patient    Relationship to Learner SELF           Pt currently taking Anticoagulant therapy? no    Pt currently taking Antiplatelet therapy ? ASA 81 mg 1x daily       Coordination of Care:  1. Have you been to the ER, urgent care clinic since your last visit? Hospitalized since your last visit? no    2. Have you seen or consulted any other health care providers outside of the [facility] since your last visit? Include any pap smears or colon screening. no

## 2024-08-12 NOTE — PROGRESS NOTES
08/12/24     Michele Avalos III  is a 78 y.o. male     Chief Complaint   Patient presents with    Follow-up     6 month f/u     Device Check     Medtronic     Edema     Bilateral ankle edema and Rt leg edema        Edema  Pertinent negatives include no abdominal pain, arthralgias, chest pain, chills, congestion, coughing, fatigue, fever, headaches, nausea, rash, sore throat, vomiting or weakness.     Patient presents for a follow-up office visit.  Patient underwent a pharmacological nuclear stress test in July 2022 which suggested single-vessel disease with a partially reversible inferolateral perfusion defect and preserved LV function, EF 63%.  He then started complaining of increased exertional chest pain consistent with CCS class II angina, so he underwent a cardiac catheterization in September 2022 which revealed severe high-grade two-vessel CAD involving his left circumflex/obtuse marginal branch and also his LAD.  He underwent PCI with 2 drug-eluting stents to his left circumflex by our interventional cardiologist with plan for staged PCI to his LAD.    In October 2022, he underwent a scheduled PCI to both his proximal LAD lesion in his distal LAD lesion using a total of 2 drug-eluting stents.  He stopped his dual antiplatelet therapy after 12 months and now remains on an aspirin.    He developed recurrent syncope leading to a hospitalization in August 2023 where he underwent an echocardiogram which showed preserved LVEF of 65%, no valvular heart disease or pulmonary hypertension.  He also wore an outpatient event monitor which was unremarkable.  Due to another syncopal episode, he ultimately underwent implantation of an implantable Medtronic cardiac monitor and December 2023.  Since the device was placed he has had no recurrent syncope or near syncope.    The patient was last seen in our office 6 months ago.  Since last visit he states he has been feeling about the same.  He had no recurrent syncope or near

## 2024-09-04 ENCOUNTER — HOSPITAL ENCOUNTER (OUTPATIENT)
Facility: HOSPITAL | Age: 79
Discharge: HOME | End: 2024-09-07
Payer: COMMERCIAL

## 2024-09-04 LAB
25(OH)D3 SERPL-MCNC: 49.4 NG/ML (ref 30–100)
ALBUMIN SERPL-MCNC: 2.9 G/DL (ref 3.4–5)
ANION GAP SERPL CALC-SCNC: 8 MMOL/L (ref 3–18)
BUN SERPL-MCNC: 41 MG/DL (ref 7–18)
BUN/CREAT SERPL: 14 (ref 12–20)
CALCIUM SERPL-MCNC: 8.6 MG/DL (ref 8.5–10.1)
CALCIUM SERPL-MCNC: 8.9 MG/DL (ref 8.5–10.1)
CHLORIDE SERPL-SCNC: 111 MMOL/L (ref 100–111)
CO2 SERPL-SCNC: 21 MMOL/L (ref 21–32)
CREAT SERPL-MCNC: 2.88 MG/DL (ref 0.6–1.3)
CREAT UR-MCNC: 107 MG/DL (ref 30–125)
FERRITIN SERPL-MCNC: 250 NG/ML (ref 8–388)
GLUCOSE SERPL-MCNC: 231 MG/DL (ref 74–99)
HCT VFR BLD AUTO: 36 % (ref 36–48)
HGB BLD-MCNC: 11.3 G/DL (ref 13–16)
IRON SATN MFR SERPL: 35 % (ref 20–50)
IRON SERPL-MCNC: 68 UG/DL (ref 50–175)
MICROALBUMIN UR-MCNC: 284 MG/DL (ref 0–3)
MICROALBUMIN/CREAT UR-RTO: 2654 MG/G (ref 0–30)
PHOSPHATE SERPL-MCNC: 3.5 MG/DL (ref 2.5–4.9)
POTASSIUM SERPL-SCNC: 4.2 MMOL/L (ref 3.5–5.5)
PTH-INTACT SERPL-MCNC: 175.5 PG/ML (ref 18.4–88)
SODIUM SERPL-SCNC: 140 MMOL/L (ref 136–145)
TIBC SERPL-MCNC: 192 UG/DL (ref 250–450)

## 2024-09-04 PROCEDURE — 83550 IRON BINDING TEST: CPT

## 2024-09-04 PROCEDURE — 80069 RENAL FUNCTION PANEL: CPT

## 2024-09-04 PROCEDURE — 82570 ASSAY OF URINE CREATININE: CPT

## 2024-09-04 PROCEDURE — 83970 ASSAY OF PARATHORMONE: CPT

## 2024-09-04 PROCEDURE — 82728 ASSAY OF FERRITIN: CPT

## 2024-09-04 PROCEDURE — 85014 HEMATOCRIT: CPT

## 2024-09-04 PROCEDURE — 83540 ASSAY OF IRON: CPT

## 2024-09-04 PROCEDURE — 85018 HEMOGLOBIN: CPT

## 2024-09-04 PROCEDURE — 82306 VITAMIN D 25 HYDROXY: CPT

## 2024-09-04 PROCEDURE — 82043 UR ALBUMIN QUANTITATIVE: CPT

## 2024-09-04 PROCEDURE — 36415 COLL VENOUS BLD VENIPUNCTURE: CPT

## 2024-09-16 RX ORDER — ROSUVASTATIN CALCIUM 20 MG/1
20 TABLET, COATED ORAL NIGHTLY
Qty: 90 TABLET | Refills: 3 | Status: SHIPPED | OUTPATIENT
Start: 2024-09-16

## 2024-10-16 ENCOUNTER — NURSE ONLY (OUTPATIENT)
Age: 79
End: 2024-10-16

## 2024-10-16 DIAGNOSIS — R55 SYNCOPE, UNSPECIFIED SYNCOPE TYPE: ICD-10-CM

## 2024-10-16 DIAGNOSIS — Z95.9 CARDIAC DEVICE IN SITU: Primary | ICD-10-CM

## 2024-10-17 NOTE — RESULT ENCOUNTER NOTE
Device check personally reviewed by me.  Normal device function on interrogation.  No events.  See scanned interrogation document for complete details.

## 2024-10-21 ENCOUNTER — TELEPHONE (OUTPATIENT)
Age: 79
End: 2024-10-21

## 2024-10-21 NOTE — TELEPHONE ENCOUNTER
Cardiac Clearance : Endoscopy, 11-15-24, Dr. Shaffer  - Patient is on ASA 81 mg once a day  Will discuss with Dr. Denny

## 2024-11-07 RX ORDER — LANOLIN ALCOHOL/MO/W.PET/CERES
1000 CREAM (GRAM) TOPICAL
COMMUNITY

## 2024-11-07 RX ORDER — ACYCLOVIR 800 MG/1
TABLET ORAL
COMMUNITY
Start: 2024-10-09

## 2024-11-07 RX ORDER — AZELASTINE HYDROCHLORIDE 0.5 MG/ML
1 SOLUTION/ DROPS OPHTHALMIC 2 TIMES DAILY PRN
COMMUNITY
Start: 2024-09-05

## 2024-11-07 RX ORDER — FUROSEMIDE 40 MG/1
40 TABLET ORAL DAILY
COMMUNITY
Start: 2024-09-24

## 2024-11-07 NOTE — PROGRESS NOTES
Instructions for your procedure at [facility]      Today's Date: 11/7/2024      Patient's Name: Michele Avalos III      Procedure Date: November 15, 2024        Please enter the main entrance of the hospital and check-in at the  located in the lobby.      Do NOT eat or drink anything, including candy, gum, or ice chips after midnight prior to your procedure, unless it is part of your prep.  Brush your teeth before coming to the hospital.You may swish with water, but do not swallow.  No smoking/Vaping/E-Cigarettes 24 hours prior to the day of procedure.  No alcohol 24 hours prior to the day of procedure.  No recreational drugs for one week prior to the day of procedure.  Bring Photo ID, Insurance information, and Co-pay if required on day of procedure.  Bring in pertinent legal documents, such as, Medical Power of , DNR, Advance Directive, etc.  Leave all other valuables, including money/purse, at home.  Remove jewelry, including ALL body piercings, nail polish, acrylic nails, and makeup (including mascara); no lotions, powders, deodorant, and/or perfume/cologne/after shave on the skin.  Glasses and dentures may be worn to the hospital.  They must be removed prior to procedure. Please bring case/container for glasses or dentures.  11. Contacts should not be worn on day of procedure.   12. Call the office ([phone]) if you have symptoms of a cold or illness within 24-48 hours prior to your procedure.   13. AN ADULT (relative or friend 18 years or older) MUST DRIVE YOU HOME AFTER YOUR PROCEDURE.   14. Please make arrangements for a responsible adult (18 years or older) to be with you for 24 hours after your procedure.   15. TWO VISITORS will be allowed in the waiting area during your procedure.       Special Instructions:      Bring list of CURRENT medications.  Follow instructions from the office regarding Bowel Prep, Vitamins, Iron, Blood Thinners, Insulin, Seizure, and

## 2024-11-14 ENCOUNTER — ANESTHESIA EVENT (OUTPATIENT)
Facility: HOSPITAL | Age: 79
End: 2024-11-14
Payer: COMMERCIAL

## 2024-11-15 ENCOUNTER — HOSPITAL ENCOUNTER (OUTPATIENT)
Facility: HOSPITAL | Age: 79
Setting detail: OUTPATIENT SURGERY
Discharge: HOME | End: 2024-11-15
Attending: INTERNAL MEDICINE | Admitting: INTERNAL MEDICINE
Payer: COMMERCIAL

## 2024-11-15 ENCOUNTER — ANESTHESIA (OUTPATIENT)
Facility: HOSPITAL | Age: 79
End: 2024-11-15
Payer: COMMERCIAL

## 2024-11-15 VITALS
SYSTOLIC BLOOD PRESSURE: 146 MMHG | BODY MASS INDEX: 29.65 KG/M2 | WEIGHT: 200.2 LBS | RESPIRATION RATE: 16 BRPM | OXYGEN SATURATION: 97 % | HEART RATE: 54 BPM | DIASTOLIC BLOOD PRESSURE: 55 MMHG | HEIGHT: 69 IN | TEMPERATURE: 98 F

## 2024-11-15 LAB
GLUCOSE BLD STRIP.AUTO-MCNC: 119 MG/DL (ref 70–110)
GLUCOSE BLD STRIP.AUTO-MCNC: 129 MG/DL (ref 70–110)

## 2024-11-15 PROCEDURE — 3700000000 HC ANESTHESIA ATTENDED CARE: Performed by: INTERNAL MEDICINE

## 2024-11-15 PROCEDURE — 7100000000 HC PACU RECOVERY - FIRST 15 MIN: Performed by: INTERNAL MEDICINE

## 2024-11-15 PROCEDURE — 82962 GLUCOSE BLOOD TEST: CPT

## 2024-11-15 PROCEDURE — 6360000002 HC RX W HCPCS: Performed by: ANESTHESIOLOGY

## 2024-11-15 PROCEDURE — 3700000001 HC ADD 15 MINUTES (ANESTHESIA): Performed by: INTERNAL MEDICINE

## 2024-11-15 PROCEDURE — 7100000010 HC PHASE II RECOVERY - FIRST 15 MIN: Performed by: INTERNAL MEDICINE

## 2024-11-15 PROCEDURE — 2580000003 HC RX 258: Performed by: ANESTHESIOLOGY

## 2024-11-15 PROCEDURE — 7100000001 HC PACU RECOVERY - ADDTL 15 MIN: Performed by: INTERNAL MEDICINE

## 2024-11-15 PROCEDURE — 88342 IMHCHEM/IMCYTCHM 1ST ANTB: CPT

## 2024-11-15 PROCEDURE — 3600007512: Performed by: INTERNAL MEDICINE

## 2024-11-15 PROCEDURE — 2709999900 HC NON-CHARGEABLE SUPPLY: Performed by: INTERNAL MEDICINE

## 2024-11-15 PROCEDURE — 3600007502: Performed by: INTERNAL MEDICINE

## 2024-11-15 PROCEDURE — 88305 TISSUE EXAM BY PATHOLOGIST: CPT

## 2024-11-15 RX ORDER — PROPOFOL 10 MG/ML
INJECTION, EMULSION INTRAVENOUS
Status: DISCONTINUED | OUTPATIENT
Start: 2024-11-15 | End: 2024-11-15 | Stop reason: SDUPTHER

## 2024-11-15 RX ORDER — SODIUM CHLORIDE, SODIUM LACTATE, POTASSIUM CHLORIDE, CALCIUM CHLORIDE 600; 310; 30; 20 MG/100ML; MG/100ML; MG/100ML; MG/100ML
INJECTION, SOLUTION INTRAVENOUS CONTINUOUS
Status: DISCONTINUED | OUTPATIENT
Start: 2024-11-15 | End: 2024-11-15 | Stop reason: HOSPADM

## 2024-11-15 RX ORDER — INSULIN LISPRO 100 [IU]/ML
0-15 INJECTION, SOLUTION INTRAVENOUS; SUBCUTANEOUS ONCE
Status: DISCONTINUED | OUTPATIENT
Start: 2024-11-15 | End: 2024-11-15 | Stop reason: HOSPADM

## 2024-11-15 RX ORDER — DEXTROSE MONOHYDRATE 100 MG/ML
INJECTION, SOLUTION INTRAVENOUS CONTINUOUS PRN
Status: DISCONTINUED | OUTPATIENT
Start: 2024-11-15 | End: 2024-11-15 | Stop reason: HOSPADM

## 2024-11-15 RX ORDER — SODIUM CHLORIDE 9 MG/ML
INJECTION, SOLUTION INTRAVENOUS CONTINUOUS
Status: DISCONTINUED | OUTPATIENT
Start: 2024-11-15 | End: 2024-11-15 | Stop reason: HOSPADM

## 2024-11-15 RX ORDER — LIDOCAINE HYDROCHLORIDE 10 MG/ML
1 INJECTION, SOLUTION EPIDURAL; INFILTRATION; INTRACAUDAL; PERINEURAL
Status: DISCONTINUED | OUTPATIENT
Start: 2024-11-15 | End: 2024-11-15 | Stop reason: HOSPADM

## 2024-11-15 RX ADMIN — PROPOFOL 30 MG: 10 INJECTION, EMULSION INTRAVENOUS at 09:26

## 2024-11-15 RX ADMIN — PROPOFOL 100 MG: 10 INJECTION, EMULSION INTRAVENOUS at 09:21

## 2024-11-15 RX ADMIN — PROPOFOL 30 MG: 10 INJECTION, EMULSION INTRAVENOUS at 09:24

## 2024-11-15 RX ADMIN — SODIUM CHLORIDE: 9 INJECTION, SOLUTION INTRAVENOUS at 08:44

## 2024-11-15 ASSESSMENT — PAIN - FUNCTIONAL ASSESSMENT
PAIN_FUNCTIONAL_ASSESSMENT: NONE - DENIES PAIN
PAIN_FUNCTIONAL_ASSESSMENT: NONE - DENIES PAIN
PAIN_FUNCTIONAL_ASSESSMENT: 0-10
PAIN_FUNCTIONAL_ASSESSMENT: NONE - DENIES PAIN

## 2024-11-15 NOTE — ANESTHESIA POSTPROCEDURE EVALUATION
Department of Anesthesiology  Postprocedure Note    Patient: Michele Avalos III  MRN: [id number]  YOB: 1945  Date of evaluation: 11/15/2024    Procedure Summary       Date: 11/15/24 Room / Location: [facility] ENDO 02 / [facility] ENDOSCOPY    Anesthesia Start: 0917 Anesthesia Stop: 0931    Procedure: ESOPHAGOGASTRODUODENOSCOPY w/ polypectomies and BX (Upper GI Region) Diagnosis:       Iron deficiency anemia, unspecified iron deficiency anemia type      Other diseases of stomach and duodenum      Family history of colon cancer      Polyp of stomach and duodenum      Personal history of colon polyps, unspecified      (Iron deficiency anemia, unspecified iron deficiency anemia type [D50.9])      (Other diseases of stomach and duodenum [K31.89])      (Family history of colon cancer [Z80.0])      (Polyp of stomach and duodenum [K31.7])      (Personal history of colon polyps, unspecified [Z86.0100])    Surgeons: Cristina Shaffer MD Responsible Provider: Kirt Martinez MD    Anesthesia Type: MAC ASA Status: 3            Anesthesia Type: MAC    Abhi Phase I: Abhi Score: 10    Abhi Phase II: Abhi Score: 10    Anesthesia Post Evaluation    Patient location during evaluation: bedside  Patient participation: complete - patient participated  Level of consciousness: responsive to verbal stimuli  Airway patency: patent  Nausea & Vomiting: no nausea  Respiratory status: acceptable  Hydration status: euvolemic    No notable events documented.

## 2024-11-15 NOTE — ANESTHESIA PRE PROCEDURE
DiabetesType II DM, well controlled.                 Abdominal:             Vascular:          Other Findings:       Anesthesia Plan      MAC     ASA 3       Induction: intravenous.      Anesthetic plan and risks discussed with patient.                    NED FAJARDO MD   11/15/2024

## 2024-11-15 NOTE — H&P
[email or web address]  [phone]      History and Physical    Patient: Michele Avalos III MRN: [id number]  SSN: [id number]    YOB: 1945  Age: 79 y.o.  Sex: male      Subjective:      Michele Avalos is a 78-year-old male who is seen today for a follow up visit.     1. Anemia - He has a history of MGUS, followed by Dr. Susan Mac, hematologist. He has previously received iron infusions in 2021 when he was noted to have absent iron stores on bone marrow biopsy. More recently, he had an episode of syncope while visiting sister which prompted labs, noted to have drop in Hgb since comparison labs. He denies rectal bleeding or melena. He was recently started on oral iron by PCP. He also has CKD3, followed by nephrology, has not required epo.    EGD was done on 5/11/2023 and showed patchy gastritis, nonbleeding polyp in the antrum, partially retrieved 2 additional sessile gastric polyps removed, possible polypoid lesion protruding from major papilla, unable to clearly view due to endoscopic looping. UGI with SBFT done for follow-up which showed a small duodenal diverticulum (incidental finding) otherwise normal. Pathology showed HP negative, benign hyperplastic gastric polyps repeat recommended.    2. Weight loss - He reports purposeful weight loss which has been quicker than expected. Per PCP documentation, 17lbs in 3 week span. Since then, he reports weight loss has stabilized. He has been less restrictive w/ diet. He denies dysphagia, early satiety, change in appetite, nausea, vomiting. Now stable- feels likely due to stress related to divorce.    3. CRCS - Colonoscopy up to date, last done 9/2022 as noted below, 3 year repeat needed for polyps. History of villous adenoma in 2017.    Of note, recent syncope, trauma CT neg, labs stable. Currently on 30 day heart monitor and following up w/ cardiology.     Past Medical History:   Diagnosis Date    Bladder outlet obstruction     BPH (benign prostatic  MD   acetaminophen (TYLENOL) 500 MG tablet Take 1 tablet by mouth every 6 hours as needed for Pain    Provider, Historical, MD   FLUTICASONE PROPIONATE NA 2 sprays by Nasal route as needed    Automatic Reconciliation, Ar   amLODIPine (NORVASC) 10 MG tablet Take 1 tablet by mouth daily    Automatic Reconciliation, Ar   aspirin 81 MG EC tablet Take 1 tablet by mouth daily    Automatic Reconciliation, Ar   carvedilol (COREG) 25 MG tablet Take 1 tablet by mouth 2 times daily (with meals)    Automatic Reconciliation, Ar   colchicine (COLCRYS) 0.6 MG tablet Take 1 tablet by mouth daily as needed 1/12/22   Automatic Reconciliation, Ar   hydrALAZINE (APRESOLINE) 50 MG tablet Take 1 tablet by mouth See Admin Instructions Take 1 tablet by mouth in the morning, Take 1 tablet by mouth at noon, and take 2 tablets by mouth at bedtime    Automatic Reconciliation, Ar   losartan (COZAAR) 100 MG tablet Take 1 tablet by mouth daily    Automatic Reconciliation, Ar   nitroGLYCERIN (NITROSTAT) 0.4 MG SL tablet Place 1 tablet under the tongue every 5 minutes as needed 9/7/22   Automatic Reconciliation, Ar        Allergies   Allergen Reactions    Beta Adrenergic Blockers Other (See Comments)     Hypertension, diaphoretic  Other reaction(s): Hypertension  Other reaction(s): gi distress, GI intolerance, Unknown  Hypertension, diaphoretic      Amlodipine Besy-Benazepril Hcl Swelling    Amlodipine Besy-Benazepril Hcl Swelling    Nsaids Other (See Comments)     Causes Hypertension  Other reaction(s): Hypertension    Venlafaxine Nausea Only    Verapamil Nausea Only       Review of Systems:  A comprehensive review of systems was negative except for that written in the History of Present Illness.    Objective:     Vitals:    11/07/24 1132   Weight: 87.5 kg (193 lb)   Height: 1.753 m (5' 9\")        Physical Exam:  GENERAL: alert, cooperative, no distress, appears stated age  LUNG: clear to auscultation bilaterally  HEART: regular rate and rhythm,

## 2025-03-25 ENCOUNTER — OFFICE VISIT (OUTPATIENT)
Age: 80
End: 2025-03-25
Payer: COMMERCIAL

## 2025-03-25 VITALS
BODY MASS INDEX: 27.63 KG/M2 | SYSTOLIC BLOOD PRESSURE: 138 MMHG | HEIGHT: 70 IN | HEART RATE: 59 BPM | WEIGHT: 193 LBS | DIASTOLIC BLOOD PRESSURE: 62 MMHG | OXYGEN SATURATION: 97 %

## 2025-03-25 DIAGNOSIS — E78.2 MIXED HYPERLIPIDEMIA: ICD-10-CM

## 2025-03-25 DIAGNOSIS — I25.10 CORONARY ARTERY DISEASE INVOLVING NATIVE CORONARY ARTERY OF NATIVE HEART WITHOUT ANGINA PECTORIS: ICD-10-CM

## 2025-03-25 DIAGNOSIS — R55 SYNCOPE, UNSPECIFIED SYNCOPE TYPE: ICD-10-CM

## 2025-03-25 DIAGNOSIS — I10 ESSENTIAL (PRIMARY) HYPERTENSION: ICD-10-CM

## 2025-03-25 DIAGNOSIS — Z95.9 CARDIAC DEVICE IN SITU: Primary | ICD-10-CM

## 2025-03-25 PROCEDURE — 3078F DIAST BP <80 MM HG: CPT | Performed by: INTERNAL MEDICINE

## 2025-03-25 PROCEDURE — 1123F ACP DISCUSS/DSCN MKR DOCD: CPT | Performed by: INTERNAL MEDICINE

## 2025-03-25 PROCEDURE — 93000 ELECTROCARDIOGRAM COMPLETE: CPT | Performed by: INTERNAL MEDICINE

## 2025-03-25 PROCEDURE — 3075F SYST BP GE 130 - 139MM HG: CPT | Performed by: INTERNAL MEDICINE

## 2025-03-25 ASSESSMENT — ANXIETY QUESTIONNAIRES
1. FEELING NERVOUS, ANXIOUS, OR ON EDGE: NOT AT ALL
5. BEING SO RESTLESS THAT IT IS HARD TO SIT STILL: NOT AT ALL
3. WORRYING TOO MUCH ABOUT DIFFERENT THINGS: NOT AT ALL
6. BECOMING EASILY ANNOYED OR IRRITABLE: NOT AT ALL
7. FEELING AFRAID AS IF SOMETHING AWFUL MIGHT HAPPEN: NOT AT ALL
GAD7 TOTAL SCORE: 0
4. TROUBLE RELAXING: NOT AT ALL
2. NOT BEING ABLE TO STOP OR CONTROL WORRYING: NOT AT ALL

## 2025-03-25 ASSESSMENT — PATIENT HEALTH QUESTIONNAIRE - PHQ9
10. IF YOU CHECKED OFF ANY PROBLEMS, HOW DIFFICULT HAVE THESE PROBLEMS MADE IT FOR YOU TO DO YOUR WORK, TAKE CARE OF THINGS AT HOME, OR GET ALONG WITH OTHER PEOPLE: NOT DIFFICULT AT ALL
7. TROUBLE CONCENTRATING ON THINGS, SUCH AS READING THE NEWSPAPER OR WATCHING TELEVISION: NOT AT ALL
SUM OF ALL RESPONSES TO PHQ QUESTIONS 1-9: 0
3. TROUBLE FALLING OR STAYING ASLEEP: NOT AT ALL
SUM OF ALL RESPONSES TO PHQ QUESTIONS 1-9: 0
1. LITTLE INTEREST OR PLEASURE IN DOING THINGS: NOT AT ALL
8. MOVING OR SPEAKING SO SLOWLY THAT OTHER PEOPLE COULD HAVE NOTICED. OR THE OPPOSITE, BEING SO FIGETY OR RESTLESS THAT YOU HAVE BEEN MOVING AROUND A LOT MORE THAN USUAL: NOT AT ALL
9. THOUGHTS THAT YOU WOULD BE BETTER OFF DEAD, OR OF HURTING YOURSELF: NOT AT ALL
SUM OF ALL RESPONSES TO PHQ QUESTIONS 1-9: 0
SUM OF ALL RESPONSES TO PHQ QUESTIONS 1-9: 0
6. FEELING BAD ABOUT YOURSELF - OR THAT YOU ARE A FAILURE OR HAVE LET YOURSELF OR YOUR FAMILY DOWN: NOT AT ALL
2. FEELING DOWN, DEPRESSED OR HOPELESS: NOT AT ALL
5. POOR APPETITE OR OVEREATING: NOT AT ALL
4. FEELING TIRED OR HAVING LITTLE ENERGY: NOT AT ALL

## 2025-03-25 ASSESSMENT — ENCOUNTER SYMPTOMS
SHORTNESS OF BREATH: 0
COUGH: 0
VOMITING: 0
ABDOMINAL DISTENTION: 0
NAUSEA: 0
SORE THROAT: 0
ABDOMINAL PAIN: 0

## 2025-03-25 NOTE — PROGRESS NOTES
Michele Avalos III presents today for   Chief Complaint   Patient presents with    Follow-up     6 month       Michele Bailey CASTLE preferred language for health care discussion is english/other.    Is someone accompanying this pt? no    Is the patient using any DME equipment during OV? no    Depression Screening:  Depression: Not at risk (3/25/2025)    PHQ-2     PHQ-2 Score: 0        Learning Assessment:  Who is the primary learner? Patient    What is the preferred language for health care of the primary learner? ENGLISH    How does the primary learner prefer to learn new concepts? DEMONSTRATION    Answered By patient    Relationship to Learner SELF           Pt currently taking Anticoagulant therapy? no    Pt currently taking Antiplatelet therapy ? Aspirin 81 mg daily      Coordination of Care:  1. Have you been to the ER, urgent care clinic since your last visit? Hospitalized since your last visit? no    2. Have you seen or consulted any other health care providers outside of the [facility] since your last visit? Include any pap smears or colon screening. no

## 2025-03-25 NOTE — PROGRESS NOTES
03/25/25     Michele Avalos III  is a 79 y.o. male     Chief Complaint   Patient presents with    Follow-up     6 month       HPI    Patient presents for a follow-up office visit.  Patient underwent a pharmacological nuclear stress test in July 2022 which suggested single-vessel disease with a partially reversible inferolateral perfusion defect and preserved LV function, EF 63%.  He then started complaining of increased exertional chest pain consistent with CCS class II angina, so he underwent a cardiac catheterization in September 2022 which revealed severe high-grade two-vessel CAD involving his left circumflex/obtuse marginal branch and also his LAD.  He underwent PCI with 2 drug-eluting stents to his left circumflex by our interventional cardiologist with plan for staged PCI to his LAD.    In October 2022, he underwent a scheduled PCI to both his proximal LAD lesion in his distal LAD lesion using a total of 2 drug-eluting stents.  He stopped his dual antiplatelet therapy after 12 months and now remains on an aspirin.    He developed recurrent syncope leading to a hospitalization in August 2023 where he underwent an echocardiogram which showed preserved LVEF of 65%, no valvular heart disease or pulmonary hypertension.  He also wore an outpatient event monitor which was unremarkable.  Due to another syncopal episode, he ultimately underwent implantation of an implantable Medtronic cardiac monitor and December 2023.  Since the device was placed, the patient denies any recurrent syncope or near syncope.    The patient was last seen in our office 6 months ago.  Since last visit, he has been feeling well.  He denies any chest pain, shortness of breath, near-syncope or syncope.  No prolonged heart palpitations.  He does mention some dizziness when he stands up too quickly but this last less than 30 seconds in duration.    Past Medical History:   Diagnosis Date    Bladder outlet obstruction     BPH (benign prostatic

## (undated) DEVICE — YANKAUER,SMOOTH HANDLE,HIGH CAPACITY: Brand: MEDLINE INDUSTRIES, INC.

## (undated) DEVICE — DRAPE,ANGIO,BRACH,STERILE,38X44: Brand: MEDLINE

## (undated) DEVICE — INTENDED FOR TISSUE SEPARATION, AND OTHER PROCEDURES THAT REQUIRE A SHARP SURGICAL BLADE TO PUNCTURE OR CUT.: Brand: BARD-PARKER ®  SAFETY SCALPED

## (undated) DEVICE — MAYO STAND COVER: Brand: CONVERTORS

## (undated) DEVICE — MEDI-VAC NON-CONDUCTIVE SUCTION TUBING: Brand: CARDINAL HEALTH

## (undated) DEVICE — UNDERPAD INCONT W23XL36IN STD BLU POLYPR BK FLUF SFT

## (undated) DEVICE — SUTURE REMOVAL TRAY: Brand: MEDLINE INDUSTRIES, INC.

## (undated) DEVICE — BITE BLOCK ENDOSCP UNIV AD 6 TO 9.4 MM

## (undated) DEVICE — CATHETER SUCT TR FL TIP 14FR W/ O CTRL

## (undated) DEVICE — TRAP EVAC NO 5500 DISPOS-A-TRAP

## (undated) DEVICE — SOLUTION IRRIG 1000ML H2O STRL BLT

## (undated) DEVICE — SNARE VASC L240CM LOOP W10MM SHTH DIA2.4MM RND STIFF CLD

## (undated) DEVICE — FORCEPS BX L240CM JAW DIA2.4MM ORNG L CAP W/ NDL DISP RAD

## (undated) DEVICE — RETRIEVER ENDOSCP L230CM SHTH DIA2.5MM NET W3X6CM STD DISP

## (undated) DEVICE — BASIN EMSIS 16OZ GRAPHITE PLAS KID SHP MOLD GRAD FOR ORAL

## (undated) DEVICE — SUTURE VCRL SZ 4-0 L18IN ABSRB UD L19MM PC-5 3/8 CIR J823G

## (undated) DEVICE — ENDOSCOPY PUMP TUBING/ CAP SET: Brand: ERBE

## (undated) DEVICE — ELECTRODE PT RET AD L9FT HI MOIST COND ADH HYDRGEL CORDED

## (undated) DEVICE — LINER SUCT CANSTR 3000CC PLAS SFT PRE ASSEMB W/OUT TBNG W/

## (undated) DEVICE — DRESSING HYDROFIBER AQUACEL AG ADVANT 3.5X4 IN

## (undated) DEVICE — SYRINGE MED 50ML LUERSLIP TIP

## (undated) DEVICE — SYRINGE MED 25GA 3ML L5/8IN SUBQ PLAS W/ DETACH NDL SFTY

## (undated) DEVICE — GAUZE,SPONGE,4"X4",16PLY,STRL,LF,10/TRAY: Brand: MEDLINE

## (undated) DEVICE — STERILE POLYISOPRENE POWDER-FREE SURGICAL GLOVES: Brand: PROTEXIS

## (undated) DEVICE — Device: Brand: OLYMPUS

## (undated) DEVICE — FORCEPS SURG L5IN S STL STR SERR HEMSTAT MOSQ

## (undated) DEVICE — CANNULA NSL AD TBNG L14FT STD PVC O2 CRV CONN NONFLARED NSL

## (undated) DEVICE — SNARE POLYP M W27MMXL240CM OVL STIFF DISP CAPTIVATOR